# Patient Record
Sex: FEMALE | Race: WHITE | Employment: STUDENT | URBAN - NONMETROPOLITAN AREA
[De-identification: names, ages, dates, MRNs, and addresses within clinical notes are randomized per-mention and may not be internally consistent; named-entity substitution may affect disease eponyms.]

---

## 2018-09-15 ENCOUNTER — APPOINTMENT (OUTPATIENT)
Dept: GENERAL RADIOLOGY | Age: 18
DRG: 420 | End: 2018-09-15
Payer: MEDICAID

## 2018-09-15 ENCOUNTER — HOSPITAL ENCOUNTER (INPATIENT)
Age: 18
LOS: 2 days | Discharge: HOME | DRG: 420 | End: 2018-09-17
Attending: EMERGENCY MEDICINE | Admitting: HOSPITALIST
Payer: MEDICAID

## 2018-09-15 DIAGNOSIS — E10.10 TYPE 1 DIABETES MELLITUS WITH KETOACIDOSIS WITHOUT COMA (HCC): Primary | ICD-10-CM

## 2018-09-15 LAB
ALLEN TEST: NEGATIVE
AMPHETAMINE+METHAMPHETAMINE URINE SCREEN: NEGATIVE
ANION GAP SERPL CALCULATED.3IONS-SCNC: 13 MEQ/L (ref 8–16)
ANION GAP SERPL CALCULATED.3IONS-SCNC: 18 MEQ/L (ref 8–16)
ANION GAP SERPL CALCULATED.3IONS-SCNC: 34 MEQ/L (ref 8–16)
BACTERIA: ABNORMAL /HPF
BARBITURATE QUANTITATIVE URINE: NEGATIVE
BASE EXCESS (CALCULATED): -20 MMOL/L (ref -2.5–2.5)
BASOPHILS # BLD: 0.4 %
BASOPHILS ABSOLUTE: 0.1 THOU/MM3 (ref 0–0.1)
BENZODIAZEPINE QUANTITATIVE URINE: NEGATIVE
BETA-HYDROXYBUTYRATE: 74.89 MG/DL (ref 0.2–2.81)
BILIRUBIN URINE: NEGATIVE
BLOOD, URINE: NEGATIVE
BUN BLDV-MCNC: 13 MG/DL (ref 7–22)
BUN BLDV-MCNC: 13 MG/DL (ref 7–22)
BUN BLDV-MCNC: 28 MG/DL (ref 7–22)
CALCIUM SERPL-MCNC: 8.3 MG/DL (ref 8.5–10.5)
CALCIUM SERPL-MCNC: 8.4 MG/DL (ref 8.5–10.5)
CALCIUM SERPL-MCNC: 9.7 MG/DL (ref 8.5–10.5)
CANNABINOID QUANTITATIVE URINE: NEGATIVE
CASTS 2: ABNORMAL /LPF
CASTS UA: ABNORMAL /LPF
CHARACTER, URINE: CLEAR
CHLORIDE BLD-SCNC: 100 MEQ/L (ref 98–111)
CHLORIDE BLD-SCNC: 106 MEQ/L (ref 98–111)
CHLORIDE BLD-SCNC: 90 MEQ/L (ref 98–111)
CO2: 15 MEQ/L (ref 23–33)
CO2: 17 MEQ/L (ref 23–33)
CO2: 6 MEQ/L (ref 23–33)
COCAINE METABOLITE QUANTITATIVE URINE: NEGATIVE
COLLECTED BY:: ABNORMAL
COLOR: YELLOW
CREAT SERPL-MCNC: 0.8 MG/DL (ref 0.4–1.2)
CREAT SERPL-MCNC: 1 MG/DL (ref 0.4–1.2)
CREAT SERPL-MCNC: 1.3 MG/DL (ref 0.4–1.2)
CRYSTALS, UA: ABNORMAL
DEVICE: ABNORMAL
EOSINOPHIL # BLD: 0 %
EOSINOPHILS ABSOLUTE: 0 THOU/MM3 (ref 0–0.4)
EPITHELIAL CELLS, UA: ABNORMAL /HPF
ERYTHROCYTE [DISTWIDTH] IN BLOOD BY AUTOMATED COUNT: 11.9 % (ref 11.5–14.5)
ERYTHROCYTE [DISTWIDTH] IN BLOOD BY AUTOMATED COUNT: 41.1 FL (ref 35–45)
ETHYL ALCOHOL, SERUM: < 0.01 %
GLUCOSE BLD-MCNC: 107 MG/DL (ref 70–108)
GLUCOSE BLD-MCNC: 120 MG/DL (ref 70–108)
GLUCOSE BLD-MCNC: 138 MG/DL (ref 70–108)
GLUCOSE BLD-MCNC: 160 MG/DL (ref 70–108)
GLUCOSE BLD-MCNC: 166 MG/DL (ref 70–108)
GLUCOSE BLD-MCNC: 177 MG/DL (ref 70–108)
GLUCOSE BLD-MCNC: 191 MG/DL (ref 70–108)
GLUCOSE BLD-MCNC: 195 MG/DL (ref 70–108)
GLUCOSE BLD-MCNC: 198 MG/DL (ref 70–108)
GLUCOSE BLD-MCNC: 213 MG/DL (ref 70–108)
GLUCOSE BLD-MCNC: 225 MG/DL (ref 70–108)
GLUCOSE BLD-MCNC: 243 MG/DL (ref 70–108)
GLUCOSE BLD-MCNC: 270 MG/DL (ref 70–108)
GLUCOSE BLD-MCNC: 288 MG/DL (ref 70–108)
GLUCOSE BLD-MCNC: 298 MG/DL (ref 70–108)
GLUCOSE BLD-MCNC: 307 MG/DL (ref 70–108)
GLUCOSE BLD-MCNC: 311 MG/DL (ref 70–108)
GLUCOSE BLD-MCNC: 328 MG/DL (ref 70–108)
GLUCOSE BLD-MCNC: 416 MG/DL (ref 70–108)
GLUCOSE BLD-MCNC: 570 MG/DL (ref 70–108)
GLUCOSE BLD-MCNC: 627 MG/DL (ref 70–108)
GLUCOSE BLD-MCNC: > 600 MG/DL (ref 70–108)
GLUCOSE URINE: >= 1000 MG/DL
HCO3: 6 MMOL/L (ref 23–28)
HCT VFR BLD CALC: 44.5 % (ref 37–47)
HEMOGLOBIN: 14.1 GM/DL (ref 12–16)
IMMATURE GRANS (ABS): 0.17 THOU/MM3 (ref 0–0.07)
IMMATURE GRANULOCYTES: 0.6 %
KETONES, URINE: >= 160
LACTIC ACID: 4.7 MMOL/L (ref 0.5–2.2)
LEUKOCYTE ESTERASE, URINE: NEGATIVE
LYMPHOCYTES # BLD: 5.3 %
LYMPHOCYTES ABSOLUTE: 1.5 THOU/MM3 (ref 1–4.8)
MAGNESIUM: 1.8 MG/DL (ref 1.6–2.4)
MAGNESIUM: 1.8 MG/DL (ref 1.6–2.4)
MCH RBC QN AUTO: 29.7 PG (ref 26–33)
MCHC RBC AUTO-ENTMCNC: 31.7 GM/DL (ref 32.2–35.5)
MCV RBC AUTO: 93.7 FL (ref 81–99)
MISCELLANEOUS 2: ABNORMAL
MONOCYTES # BLD: 0.7 %
MONOCYTES ABSOLUTE: 0.2 THOU/MM3 (ref 0.4–1.3)
MRSA SCREEN RT-PCR: NEGATIVE
NITRITE, URINE: NEGATIVE
NUCLEATED RED BLOOD CELLS: 0 /100 WBC
O2 SATURATION: 98 %
OPIATES, URINE: NEGATIVE
OSMOLALITY CALCULATION: 295.6 MOSMOL/KG (ref 275–300)
OXYCODONE: NEGATIVE
PCO2: 18 MMHG (ref 35–45)
PH BLOOD GAS: 7.16 (ref 7.35–7.45)
PH UA: 5
PHENCYCLIDINE QUANTITATIVE URINE: NEGATIVE
PHOSPHORUS: 1.4 MG/DL (ref 2.4–4.7)
PHOSPHORUS: 3 MG/DL (ref 2.4–4.7)
PLATELET # BLD: 439 THOU/MM3 (ref 130–400)
PLATELET ESTIMATE: ABNORMAL
PMV BLD AUTO: 10.9 FL (ref 9.4–12.4)
PO2: 128 MMHG (ref 71–104)
POTASSIUM REFLEX MAGNESIUM: 5.2 MEQ/L (ref 3.5–5.2)
POTASSIUM SERPL-SCNC: 4.2 MEQ/L (ref 3.5–5.2)
POTASSIUM SERPL-SCNC: 4.5 MEQ/L (ref 3.5–5.2)
PROTEIN UA: ABNORMAL
RBC # BLD: 4.75 MILL/MM3 (ref 4.2–5.4)
RBC URINE: ABNORMAL /HPF
RENAL EPITHELIAL, UA: ABNORMAL
SCAN OF BLOOD SMEAR: NORMAL
SEG NEUTROPHILS: 93 %
SEGMENTED NEUTROPHILS ABSOLUTE COUNT: 25.6 THOU/MM3 (ref 1.8–7.7)
SODIUM BLD-SCNC: 130 MEQ/L (ref 135–145)
SODIUM BLD-SCNC: 133 MEQ/L (ref 135–145)
SODIUM BLD-SCNC: 136 MEQ/L (ref 135–145)
SOURCE, BLOOD GAS: ABNORMAL
SPECIFIC GRAVITY, URINE: 1.02 (ref 1–1.03)
UROBILINOGEN, URINE: 0.2 EU/DL
WBC # BLD: 27.5 THOU/MM3 (ref 4.8–10.8)
WBC UA: ABNORMAL /HPF
YEAST: ABNORMAL

## 2018-09-15 PROCEDURE — 2580000003 HC RX 258: Performed by: EMERGENCY MEDICINE

## 2018-09-15 PROCEDURE — 83735 ASSAY OF MAGNESIUM: CPT

## 2018-09-15 PROCEDURE — 80048 BASIC METABOLIC PNL TOTAL CA: CPT

## 2018-09-15 PROCEDURE — 6360000002 HC RX W HCPCS: Performed by: INTERNAL MEDICINE

## 2018-09-15 PROCEDURE — 80307 DRUG TEST PRSMV CHEM ANLYZR: CPT

## 2018-09-15 PROCEDURE — 2580000003 HC RX 258: Performed by: INTERNAL MEDICINE

## 2018-09-15 PROCEDURE — 85025 COMPLETE CBC W/AUTO DIFF WBC: CPT

## 2018-09-15 PROCEDURE — 87081 CULTURE SCREEN ONLY: CPT

## 2018-09-15 PROCEDURE — 83605 ASSAY OF LACTIC ACID: CPT

## 2018-09-15 PROCEDURE — 82803 BLOOD GASES ANY COMBINATION: CPT

## 2018-09-15 PROCEDURE — 2709999900 HC NON-CHARGEABLE SUPPLY

## 2018-09-15 PROCEDURE — 96366 THER/PROPH/DIAG IV INF ADDON: CPT

## 2018-09-15 PROCEDURE — 84100 ASSAY OF PHOSPHORUS: CPT

## 2018-09-15 PROCEDURE — 87641 MR-STAPH DNA AMP PROBE: CPT

## 2018-09-15 PROCEDURE — 6360000002 HC RX W HCPCS: Performed by: EMERGENCY MEDICINE

## 2018-09-15 PROCEDURE — 82010 KETONE BODYS QUAN: CPT

## 2018-09-15 PROCEDURE — 36600 WITHDRAWAL OF ARTERIAL BLOOD: CPT

## 2018-09-15 PROCEDURE — 96375 TX/PRO/DX INJ NEW DRUG ADDON: CPT

## 2018-09-15 PROCEDURE — 82948 REAGENT STRIP/BLOOD GLUCOSE: CPT

## 2018-09-15 PROCEDURE — 99285 EMERGENCY DEPT VISIT HI MDM: CPT

## 2018-09-15 PROCEDURE — 96365 THER/PROPH/DIAG IV INF INIT: CPT

## 2018-09-15 PROCEDURE — 2000000000 HC ICU R&B

## 2018-09-15 PROCEDURE — 36415 COLL VENOUS BLD VENIPUNCTURE: CPT

## 2018-09-15 PROCEDURE — 96376 TX/PRO/DX INJ SAME DRUG ADON: CPT

## 2018-09-15 PROCEDURE — 6370000000 HC RX 637 (ALT 250 FOR IP): Performed by: EMERGENCY MEDICINE

## 2018-09-15 PROCEDURE — 87086 URINE CULTURE/COLONY COUNT: CPT

## 2018-09-15 PROCEDURE — 81001 URINALYSIS AUTO W/SCOPE: CPT

## 2018-09-15 PROCEDURE — 99291 CRITICAL CARE FIRST HOUR: CPT | Performed by: INTERNAL MEDICINE

## 2018-09-15 PROCEDURE — G0480 DRUG TEST DEF 1-7 CLASSES: HCPCS

## 2018-09-15 RX ORDER — DEXTROSE MONOHYDRATE 25 G/50ML
12.5 INJECTION, SOLUTION INTRAVENOUS PRN
Status: DISCONTINUED | OUTPATIENT
Start: 2018-09-15 | End: 2018-09-17 | Stop reason: HOSPADM

## 2018-09-15 RX ORDER — ONDANSETRON 2 MG/ML
4 INJECTION INTRAMUSCULAR; INTRAVENOUS EVERY 30 MIN PRN
Status: DISCONTINUED | OUTPATIENT
Start: 2018-09-15 | End: 2018-09-17 | Stop reason: HOSPADM

## 2018-09-15 RX ORDER — NICOTINE POLACRILEX 4 MG
15 LOZENGE BUCCAL PRN
Status: DISCONTINUED | OUTPATIENT
Start: 2018-09-15 | End: 2018-09-17 | Stop reason: HOSPADM

## 2018-09-15 RX ORDER — 0.9 % SODIUM CHLORIDE 0.9 %
30 INTRAVENOUS SOLUTION INTRAVENOUS ONCE
Status: COMPLETED | OUTPATIENT
Start: 2018-09-15 | End: 2018-09-15

## 2018-09-15 RX ORDER — DEXTROSE MONOHYDRATE 25 G/50ML
12.5 INJECTION, SOLUTION INTRAVENOUS PRN
Status: DISCONTINUED | OUTPATIENT
Start: 2018-09-15 | End: 2018-09-15 | Stop reason: SDUPTHER

## 2018-09-15 RX ORDER — DEXTROSE AND SODIUM CHLORIDE 5; .9 G/100ML; G/100ML
INJECTION, SOLUTION INTRAVENOUS CONTINUOUS
Status: DISCONTINUED | OUTPATIENT
Start: 2018-09-15 | End: 2018-09-16

## 2018-09-15 RX ORDER — HYDROCODONE BITARTRATE AND ACETAMINOPHEN 5; 325 MG/1; MG/1
2 TABLET ORAL EVERY 4 HOURS PRN
Status: DISCONTINUED | OUTPATIENT
Start: 2018-09-15 | End: 2018-09-17 | Stop reason: HOSPADM

## 2018-09-15 RX ORDER — ACETAMINOPHEN 325 MG/1
650 TABLET ORAL EVERY 4 HOURS PRN
Status: DISCONTINUED | OUTPATIENT
Start: 2018-09-15 | End: 2018-09-17 | Stop reason: HOSPADM

## 2018-09-15 RX ORDER — SODIUM CHLORIDE 0.9 % (FLUSH) 0.9 %
10 SYRINGE (ML) INJECTION EVERY 12 HOURS SCHEDULED
Status: DISCONTINUED | OUTPATIENT
Start: 2018-09-15 | End: 2018-09-17 | Stop reason: HOSPADM

## 2018-09-15 RX ORDER — FLUOXETINE HYDROCHLORIDE 20 MG/1
20 CAPSULE ORAL DAILY
COMMUNITY
End: 2020-02-04

## 2018-09-15 RX ORDER — SODIUM CHLORIDE 0.9 % (FLUSH) 0.9 %
10 SYRINGE (ML) INJECTION PRN
Status: DISCONTINUED | OUTPATIENT
Start: 2018-09-15 | End: 2018-09-17 | Stop reason: HOSPADM

## 2018-09-15 RX ORDER — DEXTROSE MONOHYDRATE 50 MG/ML
100 INJECTION, SOLUTION INTRAVENOUS PRN
Status: DISCONTINUED | OUTPATIENT
Start: 2018-09-15 | End: 2018-09-17 | Stop reason: HOSPADM

## 2018-09-15 RX ORDER — HYDROCODONE BITARTRATE AND ACETAMINOPHEN 5; 325 MG/1; MG/1
1 TABLET ORAL EVERY 4 HOURS PRN
Status: DISCONTINUED | OUTPATIENT
Start: 2018-09-15 | End: 2018-09-17 | Stop reason: HOSPADM

## 2018-09-15 RX ORDER — SODIUM CHLORIDE 9 MG/ML
INJECTION, SOLUTION INTRAVENOUS CONTINUOUS
Status: DISCONTINUED | OUTPATIENT
Start: 2018-09-15 | End: 2018-09-15

## 2018-09-15 RX ADMIN — DEXTROSE AND SODIUM CHLORIDE: 5; 900 INJECTION, SOLUTION INTRAVENOUS at 23:30

## 2018-09-15 RX ADMIN — ENOXAPARIN SODIUM 40 MG: 40 INJECTION SUBCUTANEOUS at 16:06

## 2018-09-15 RX ADMIN — SODIUM CHLORIDE 1 UNITS/HR: 9 INJECTION, SOLUTION INTRAVENOUS at 03:58

## 2018-09-15 RX ADMIN — SODIUM CHLORIDE 2313 ML: 9 INJECTION, SOLUTION INTRAVENOUS at 03:07

## 2018-09-15 RX ADMIN — SODIUM CHLORIDE: 9 INJECTION, SOLUTION INTRAVENOUS at 05:41

## 2018-09-15 RX ADMIN — SODIUM CHLORIDE: 9 INJECTION, SOLUTION INTRAVENOUS at 08:49

## 2018-09-15 RX ADMIN — SODIUM CHLORIDE: 9 INJECTION, SOLUTION INTRAVENOUS at 17:20

## 2018-09-15 RX ADMIN — Medication 4 MG: at 03:06

## 2018-09-15 RX ADMIN — Medication 10 UNITS: at 03:10

## 2018-09-15 ASSESSMENT — PAIN SCALES - GENERAL
PAINLEVEL_OUTOF10: 0

## 2018-09-15 ASSESSMENT — ENCOUNTER SYMPTOMS
WHEEZING: 0
DIARRHEA: 0
SORE THROAT: 0
CHEST TIGHTNESS: 1
COUGH: 0
BLOOD IN STOOL: 0
ABDOMINAL PAIN: 0
VOMITING: 1
NAUSEA: 1
BACK PAIN: 0
SHORTNESS OF BREATH: 1

## 2018-09-15 NOTE — PLAN OF CARE
Problem: Discharge Planning:  Goal: Discharged to appropriate level of care  Discharged to appropriate level of care   Outcome: Ongoing  Plans for home to dorm/alone    Problem: Fluid Volume - Imbalance:  Goal: Will remain free of signs and symptoms of dehydration  Will remain free of signs and symptoms of dehydration   Outcome: Met This Shift    Goal: Absence of imbalanced fluid volume signs and symptoms  Absence of imbalanced fluid volume signs and symptoms   Outcome: Ongoing  Fluids ongoing, monitoring labs      Problem: Serum Glucose Level - Abnormal:  Goal: Ability to maintain appropriate glucose levels will improve  Ability to maintain appropriate glucose levels will improve  Outcome: Not Met This Shift  Insulin gtt continues - Type 1 DM      Problem: Injury - Acid Base Imbalance:  Goal: Acid-base balance  Acid-base balance   Outcome: Ongoing  Continue tx, monitoring labs

## 2018-09-15 NOTE — ED TRIAGE NOTES
Pt presents to the ED via EMS with c/o hyperglycemia. Pt reports she ran out of her Novolog yesterday around 1500. Upon arrival to the ED pts BS is 570. Dr Vanessa Dad to bedside. Pt alert and oriented x4. Pt has a Hx of anxiety and is reporting anxiety at this time,  on arrival. Call light in reach. Will monitor.

## 2018-09-15 NOTE — ED PROVIDER NOTES
exhibits no distension and no pulsatile midline mass. There is no tenderness. There is no rebound and no guarding. Musculoskeletal: Normal range of motion. She exhibits no edema or tenderness (No calf pain or tenderness. No evidence of DVT). Neurological: She is alert and oriented to person, place, and time. She has normal strength. She displays no tremor. She displays no seizure activity. Coordination normal. GCS eye subscore is 4. GCS verbal subscore is 5. GCS motor subscore is 6. Skin: Skin is warm and dry. No rash (on exposed surfaced) noted. She is not diaphoretic. No cyanosis or erythema. There is pallor. Psychiatric: She has a normal mood and affect. Her speech is normal and behavior is normal.   Nursing note and vitals reviewed. DIFFERENTIAL DIAGNOSIS:   Hyperglycemia, DKA, dehydration or electrolyte abnormality. DIAGNOSTIC RESULTS     RADIOLOGY: non-plain film images(s) such as CT, Ultrasound and MRI are read by the radiologist.    Patient refused a chest x-ray.     LABS:   Results for orders placed or performed during the hospital encounter of 09/15/18   CBC auto differential   Result Value Ref Range    WBC 27.5 (H) 4.8 - 10.8 thou/mm3    RBC 4.75 4.20 - 5.40 mill/mm3    Hemoglobin 14.1 12.0 - 16.0 gm/dl    Hematocrit 44.5 37.0 - 47.0 %    MCV 93.7 81.0 - 99.0 fL    MCH 29.7 26.0 - 33.0 pg    MCHC 31.7 (L) 32.2 - 35.5 gm/dl    RDW-CV 11.9 11.5 - 14.5 %    RDW-SD 41.1 35.0 - 45.0 fL    Platelets 473 (H) 880 - 400 thou/mm3    MPV 10.9 9.4 - 12.4 fL    Seg Neutrophils 93.0 %    Lymphocytes 5.3 %    Monocytes 0.7 %    Eosinophils 0.0 %    Basophils 0.4 %    Immature Granulocytes 0.6 %    Platelet Estimate SL INCREASED Adequate    Segs Absolute 25.6 (H) 1.8 - 7.7 thou/mm3    Lymphocytes # 1.5 1.0 - 4.8 thou/mm3    Monocytes # 0.2 (L) 0.4 - 1.3 thou/mm3    Eosinophils # 0.0 0.0 - 0.4 thou/mm3    Basophils # 0.1 0.0 - 0.1 thou/mm3    Immature Grans (Abs) 0.17 (H) 0.00 - 0.07 thou/mm3    nRBC 0 /100 and asked that I enter facilitated orders to admit to ICU on his behalf. Hospitalist to assume further care and orders for patient at this time. CRITICAL CARE:  There was a high probability of clinically significant/life threatening deterioration in this patient's condition which required my urgent intervention. Total critical care time was 40 minutes. This excludes any time for separately reportable procedures. FINAL IMPRESSION      1.  Type 1 diabetes mellitus with ketoacidosis without coma [facility])          DISPOSITION/PLAN   DISPOSITION Admitted 09/15/2018 06:36:11 AM    PATIENT REFERRED TO:  Tanvi Borjas MD  [address] [facility] [address]  [address] [facility]  [address] [facility] [address]  Jenniffer Reyna MD  [address] [facility], [address] [place] [address] [id number]            DISCHARGE MEDICATIONS:  New Prescriptions    No medications on file     Dr. Ha Wilkes M.D 9/15/18 6:41 AM              Ha Wilkes MD  09/15/18 9752

## 2018-09-15 NOTE — CONSULTS
 Sexual activity: Not Asked     Other Topics Concern    None     Social History Narrative    None         History reviewed. No pertinent family history. CURRENT MEDICATIONS  Reviewed       ALLERGIES  Allergies   Allergen Reactions    Sulfa Antibiotics        PHYSICAL EXAM:    VITAL SIGNS: BP (!) 116/59   Pulse 108   Temp 99.4 °F (37.4 °C) (Oral)   Resp 16   Ht 4' 11\" (1.499 m)   Wt 170 lb (77.1 kg)   LMP 08/18/2018   SpO2 97%   BMI 34.34 kg/m²          Constitutional: Well developed, Well nourished. Pleasant young adult female, lying in bed  HENT: Head normocephalic, atraumatic; Bilateral external ears normal; Mucous membranes pink and dry; oropharynx without exudates. Eyes: PERRL, Conjunctiva normal, No discharge. Anicteric sclera. Neck: Supple, No tenderness, Trachea midline, no JVD   Lymphatic: No lymphadenopathy noted. Cardiovascular: Normal heart rate, Normal rhythm, No murmurs, No rubs, No gallops. Thorax & Lungs: Clear to auscultation bilaterally. No wheezes. No rhonchi. No crackles. Symmetrical chest expansion, no accessory muscle use  Abdomen: Bowel sounds normal, Soft, No tenderness, No distension. No organomegaly, No pulsatile masses. Skin: Warm, Dry, No erythema, No rash. Extremities: No edema of lower extremities. No tenderness. No cyanosis. No clubbing. Good peripheral pulse/capillary refill. Musculoskeletal:  No tenderness to palpation or major deformities noted. Neurologic: Alert & oriented x 3. No gross focal deficits noted. Psychiatric: Affect normal, Judgment normal, Mood normal.       INTAKE/OUTPUT:  No intake/output data recorded. No intake/output data recorded. LABS  Hematology  Recent Labs      09/15/18   0326   WBC  27.5*   HCT  44.5   PLT  439*     No results for input(s): PROTIME, INR, PTT in the last 72 hours.     Chemistries  Recent Labs      09/15/18   0326   NA  130*   K  5.2   CL  90*   CO2  6*   BUN  28*   CREATININE  1.3*     Recent Labs

## 2018-09-15 NOTE — ED NOTES
In to reassess pt. Pt back to cot following up to bathroom, pt tolerated well. VS stable. No significant changes. Will continue to monitor.      Nolvia Palomino RN  09/15/18 3174

## 2018-09-16 LAB
ANION GAP SERPL CALCULATED.3IONS-SCNC: 12 MEQ/L (ref 8–16)
ANION GAP SERPL CALCULATED.3IONS-SCNC: 15 MEQ/L (ref 8–16)
ANION GAP SERPL CALCULATED.3IONS-SCNC: 15 MEQ/L (ref 8–16)
BUN BLDV-MCNC: 7 MG/DL (ref 7–22)
BUN BLDV-MCNC: 8 MG/DL (ref 7–22)
BUN BLDV-MCNC: 9 MG/DL (ref 7–22)
CALCIUM SERPL-MCNC: 8.1 MG/DL (ref 8.5–10.5)
CALCIUM SERPL-MCNC: 8.1 MG/DL (ref 8.5–10.5)
CALCIUM SERPL-MCNC: 8.9 MG/DL (ref 8.5–10.5)
CHLORIDE BLD-SCNC: 102 MEQ/L (ref 98–111)
CHLORIDE BLD-SCNC: 106 MEQ/L (ref 98–111)
CHLORIDE BLD-SCNC: 106 MEQ/L (ref 98–111)
CO2: 15 MEQ/L (ref 23–33)
CO2: 17 MEQ/L (ref 23–33)
CO2: 20 MEQ/L (ref 23–33)
CREAT SERPL-MCNC: 0.7 MG/DL (ref 0.4–1.2)
GLUCOSE BLD-MCNC: 120 MG/DL (ref 70–108)
GLUCOSE BLD-MCNC: 131 MG/DL (ref 70–108)
GLUCOSE BLD-MCNC: 135 MG/DL (ref 70–108)
GLUCOSE BLD-MCNC: 152 MG/DL (ref 70–108)
GLUCOSE BLD-MCNC: 155 MG/DL (ref 70–108)
GLUCOSE BLD-MCNC: 170 MG/DL (ref 70–108)
GLUCOSE BLD-MCNC: 170 MG/DL (ref 70–108)
GLUCOSE BLD-MCNC: 177 MG/DL (ref 70–108)
GLUCOSE BLD-MCNC: 177 MG/DL (ref 70–108)
GLUCOSE BLD-MCNC: 179 MG/DL (ref 70–108)
GLUCOSE BLD-MCNC: 184 MG/DL (ref 70–108)
MAGNESIUM: 1.7 MG/DL (ref 1.6–2.4)
MAGNESIUM: 1.7 MG/DL (ref 1.6–2.4)
ORGANISM: ABNORMAL
PHOSPHORUS: 2.7 MG/DL (ref 2.4–4.7)
PHOSPHORUS: 2.7 MG/DL (ref 2.4–4.7)
POTASSIUM SERPL-SCNC: 3.3 MEQ/L (ref 3.5–5.2)
POTASSIUM SERPL-SCNC: 3.5 MEQ/L (ref 3.5–5.2)
POTASSIUM SERPL-SCNC: 3.6 MEQ/L (ref 3.5–5.2)
SODIUM BLD-SCNC: 134 MEQ/L (ref 135–145)
SODIUM BLD-SCNC: 136 MEQ/L (ref 135–145)
SODIUM BLD-SCNC: 138 MEQ/L (ref 135–145)
URINE CULTURE REFLEX: ABNORMAL

## 2018-09-16 PROCEDURE — 36415 COLL VENOUS BLD VENIPUNCTURE: CPT

## 2018-09-16 PROCEDURE — 83735 ASSAY OF MAGNESIUM: CPT

## 2018-09-16 PROCEDURE — 2580000003 HC RX 258: Performed by: INTERNAL MEDICINE

## 2018-09-16 PROCEDURE — 2500000003 HC RX 250 WO HCPCS: Performed by: INTERNAL MEDICINE

## 2018-09-16 PROCEDURE — 6370000000 HC RX 637 (ALT 250 FOR IP): Performed by: INTERNAL MEDICINE

## 2018-09-16 PROCEDURE — 1200000000 HC SEMI PRIVATE

## 2018-09-16 PROCEDURE — 2709999900 HC NON-CHARGEABLE SUPPLY

## 2018-09-16 PROCEDURE — 84100 ASSAY OF PHOSPHORUS: CPT

## 2018-09-16 PROCEDURE — 82948 REAGENT STRIP/BLOOD GLUCOSE: CPT

## 2018-09-16 PROCEDURE — 99233 SBSQ HOSP IP/OBS HIGH 50: CPT | Performed by: INTERNAL MEDICINE

## 2018-09-16 PROCEDURE — 6360000002 HC RX W HCPCS: Performed by: INTERNAL MEDICINE

## 2018-09-16 PROCEDURE — 80048 BASIC METABOLIC PNL TOTAL CA: CPT

## 2018-09-16 RX ORDER — POTASSIUM CHLORIDE 7.45 MG/ML
10 INJECTION INTRAVENOUS
Status: DISCONTINUED | OUTPATIENT
Start: 2018-09-16 | End: 2018-09-16

## 2018-09-16 RX ORDER — FLUOXETINE HYDROCHLORIDE 20 MG/1
20 CAPSULE ORAL DAILY
Status: DISCONTINUED | OUTPATIENT
Start: 2018-09-16 | End: 2018-09-17 | Stop reason: HOSPADM

## 2018-09-16 RX ADMIN — Medication 10 ML: at 09:00

## 2018-09-16 RX ADMIN — FLUOXETINE HYDROCHLORIDE 20 MG: 20 CAPSULE ORAL at 12:47

## 2018-09-16 RX ADMIN — DEXTROSE AND SODIUM CHLORIDE: 5; 900 INJECTION, SOLUTION INTRAVENOUS at 07:51

## 2018-09-16 RX ADMIN — SODIUM PHOSPHATE, MONOBASIC, MONOHYDRATE 19.5 MMOL: 276; 142 INJECTION, SOLUTION INTRAVENOUS at 00:21

## 2018-09-16 RX ADMIN — ENOXAPARIN SODIUM 40 MG: 40 INJECTION SUBCUTANEOUS at 12:50

## 2018-09-16 RX ADMIN — Medication 10 ML: at 19:58

## 2018-09-16 RX ADMIN — POTASSIUM BICARBONATE 20 MEQ: 782 TABLET, EFFERVESCENT ORAL at 11:13

## 2018-09-16 ASSESSMENT — PAIN SCALES - GENERAL
PAINLEVEL_OUTOF10: 0

## 2018-09-16 NOTE — PROGRESS NOTES
1010 - pt.'s own Insulin which was locked in medication drawer is returned to her. She is preparing pump for insertion.

## 2018-09-16 NOTE — PLAN OF CARE
Problem: Discharge Planning:  Goal: Discharged to appropriate level of care  Discharged to appropriate level of care   Outcome: Not Met This Shift  No transfer orders. Problem: Fluid Volume - Imbalance:  Goal: Will remain free of signs and symptoms of dehydration  Will remain free of signs and symptoms of dehydration   Outcome: Ongoing  Pt still with poor skin turgor and dry mucous membranes, as well as increased thirst.   Goal: Absence of imbalanced fluid volume signs and symptoms  Absence of imbalanced fluid volume signs and symptoms   Outcome: Ongoing  No overt s/s of fluid overload, does appear sl dehydrated however. Problem: Serum Glucose Level - Abnormal:  Goal: Ability to maintain appropriate glucose levels will improve  Ability to maintain appropriate glucose levels will improve   Outcome: Ongoing  Pt on insulin gtt with DKA parameters, still with elevated glucose but much more control with this in place. Problem: Injury - Acid Base Imbalance:  Goal: Acid-base balance  Acid-base balance   Outcome: Ongoing  Last ABG ph very acidotic, however not repeated since admission. CO2 on BMP still low however with anion gap of 13-15. Comments: Care plan reviewed with patient. Patient verbalizes understanding of the plan of care and contribute to goal setting.

## 2018-09-16 NOTE — PLAN OF CARE
Problem: Discharge Planning:  Goal: Discharged to appropriate level of care  Discharged to appropriate level of care   Outcome: Met This Shift      Problem: Fluid Volume - Imbalance:  Goal: Will remain free of signs and symptoms of dehydration  Will remain free of signs and symptoms of dehydration   Outcome: Met This Shift    Goal: Absence of imbalanced fluid volume signs and symptoms  Absence of imbalanced fluid volume signs and symptoms   Outcome: Met This Shift      Problem: Serum Glucose Level - Abnormal:  Goal: Ability to maintain appropriate glucose levels will improve  Ability to maintain appropriate glucose levels will improve   Outcome: Ongoing  Insulin pump resumed     Problem: Injury - Acid Base Imbalance:  Goal: Acid-base balance  Acid-base balance   Outcome: Ongoing  Monitoring

## 2018-09-16 NOTE — PROGRESS NOTES
Hospitalist Progress Note    Patient:  Gregory Lira      Unit/Bed:5K-08/008-A    YOB: 2000    MRN: [id number]       Acct: [de-identified]     PCP: Kaleb Cruz MD    Date of Admission: 9/15/2018    Chief Complaint: Nausea, vomiting, DKA    Hospital Course: Admitted to ICU with DKA, initial anion gap 35. Patient received copious IVF and placed on IV insulin gtt. Episode due to running out of insulin for her pump. Subjective: 9/16/18 - Feels much better, denies any specific complaints at this time. Medications:  Reviewed    Infusion Medications    Insulin Pump - insulin aspart 1.2 Units/hr (09/16/18 1040)    dextrose       Scheduled Medications    potassium bicarb-citric acid  20 mEq Oral BID    FLUoxetine  20 mg Oral Daily    sodium chloride flush  10 mL Intravenous 2 times per day    enoxaparin  40 mg Subcutaneous Q24H    potassium (CARDIAC) replacement protocol   Other RX Placeholder    calcium replacement protocol   Other RX Placeholder    phosphorus replacement protocol   Other RX Placeholder    magnesium replacement protocol   Other RX Placeholder     PRN Meds: ondansetron, sodium chloride flush, acetaminophen, HYDROcodone 5 mg - acetaminophen **OR** HYDROcodone 5 mg - acetaminophen, glucose, dextrose, glucagon (rDNA), dextrose      Intake/Output Summary (Last 24 hours) at 09/16/18 1739  Last data filed at 09/16/18 1253   Gross per 24 hour   Intake          4087.49 ml   Output                0 ml   Net          4087.49 ml       Diet:  DIET CARB CONTROL; Carb Control: 4 carb choices (60 gms)/meal    Exam:  /76   Pulse 95   Temp 98 °F (36.7 °C) (Oral)   Resp 18   Ht 4' 11\" (1.499 m)   Wt 167 lb 15.9 oz (76.2 kg)   LMP 08/18/2018   SpO2 95%   BMI 33.93 kg/m²     General appearance: No apparent distress, appears stated age and cooperative. HEENT: Pupils equal, round, and reactive to light. Conjunctivae/corneas clear. Neck: Supple, with full range of motion. [] Kaleb       [] Other-    Code Status: Full Code    PT/OT Eval Status: no    Assessment/Plan:    Anticipated Discharge in : 1 day    Active Hospital Problems    Diagnosis Date Noted    Insulin-dependent diabetes mellitus with ketoacidosis ([facility] [E11.10, Z79.4] 09/15/2018       1. Restarted patient's own insulin pump today and monitoring glucose. 2. Transfer out of ICU  3.  Anticipate discharge in the AM        Electronically signed by Pranay Bailey DO on 9/16/2018 at 5:39 PM

## 2018-09-17 VITALS
SYSTOLIC BLOOD PRESSURE: 132 MMHG | WEIGHT: 167.99 LBS | OXYGEN SATURATION: 96 % | BODY MASS INDEX: 33.87 KG/M2 | TEMPERATURE: 97.6 F | RESPIRATION RATE: 20 BRPM | DIASTOLIC BLOOD PRESSURE: 78 MMHG | HEIGHT: 59 IN | HEART RATE: 83 BPM

## 2018-09-17 LAB
ANION GAP SERPL CALCULATED.3IONS-SCNC: 14 MEQ/L (ref 8–16)
BUN BLDV-MCNC: 5 MG/DL (ref 7–22)
CALCIUM SERPL-MCNC: 9 MG/DL (ref 8.5–10.5)
CHLORIDE BLD-SCNC: 104 MEQ/L (ref 98–111)
CO2: 22 MEQ/L (ref 23–33)
CREAT SERPL-MCNC: 0.5 MG/DL (ref 0.4–1.2)
GLUCOSE BLD-MCNC: 144 MG/DL (ref 70–108)
MAGNESIUM: 1.8 MG/DL (ref 1.6–2.4)
MRSA SCREEN: NORMAL
POTASSIUM SERPL-SCNC: 3.6 MEQ/L (ref 3.5–5.2)
SODIUM BLD-SCNC: 140 MEQ/L (ref 135–145)
VRE CULTURE: NORMAL

## 2018-09-17 PROCEDURE — 80048 BASIC METABOLIC PNL TOTAL CA: CPT

## 2018-09-17 PROCEDURE — 6370000000 HC RX 637 (ALT 250 FOR IP): Performed by: INTERNAL MEDICINE

## 2018-09-17 PROCEDURE — 99232 SBSQ HOSP IP/OBS MODERATE 35: CPT | Performed by: HOSPITALIST

## 2018-09-17 PROCEDURE — 36415 COLL VENOUS BLD VENIPUNCTURE: CPT

## 2018-09-17 PROCEDURE — 83735 ASSAY OF MAGNESIUM: CPT

## 2018-09-17 RX ORDER — POTASSIUM CHLORIDE 20 MEQ/1
20 TABLET, EXTENDED RELEASE ORAL ONCE
Status: COMPLETED | OUTPATIENT
Start: 2018-09-17 | End: 2018-09-17

## 2018-09-17 RX ADMIN — POTASSIUM CHLORIDE 20 MEQ: 20 TABLET, EXTENDED RELEASE ORAL at 11:44

## 2018-09-17 RX ADMIN — FLUOXETINE HYDROCHLORIDE 20 MG: 20 CAPSULE ORAL at 09:33

## 2018-09-17 NOTE — CARE COORDINATION
9/17/18, 10:10 AM    DISCHARGE BARRIERS      SW received a consult because patient does not have Insurance. KEVON met with patient. Patient is currently applying for Medicaid after being denied before. Patient is a Type 1 diabetic and has an insulin pump. Patient ran out of insulin and that is why she is in the hospital  Patient is unable to afford her insulin at this time and her aunt has been paying it out of pocket. KEVON is assisting patient in qualifying for [facility] [address] [place] [address] for Insulin. Update-10:40-  Patients insulin medications were [facility], [facility] within [facility] KEVON informed patient of this assistance and patient is aware to go down to pharmacy when discharged to  her meds before she goes home.

## 2018-09-17 NOTE — PLAN OF CARE
Problem: Discharge Planning:  Goal: Discharged to appropriate level of care  Discharged to appropriate level of care   Outcome: Ongoing  Patient planning to discharge home tomorrow. Problem: Fluid Volume - Imbalance:  Goal: Will remain free of signs and symptoms of dehydration  Will remain free of signs and symptoms of dehydration   Outcome: Ongoing  Patient free of signs and symptoms of dehydration. Skin turgor less than 3 seconds, mucus membranes moist.     Problem: Serum Glucose Level - Abnormal:  Goal: Ability to maintain appropriate glucose levels will improve  Ability to maintain appropriate glucose levels will improve   Outcome: Ongoing  Patient has insulin pump in her left back. Insulin was 177 when blood was drawn. Patient encouraged to eat a low carb diet. Will continue to monitor. Problem: Injury - Acid Base Imbalance:  Goal: Acid-base balance  Acid-base balance   Outcome: Ongoing  Patient remains alert and oriented times 4. No signs of breathing instability. Will continue to assess. Comments: Care plan reviewed with patient. Patient verbalize understanding of the plan of care and contribute to goal setting.

## 2018-09-17 NOTE — DISCHARGE SUMMARY
Hospital Medicine Discharge Summary      Patient Identification:   Edgard Tracey   : 2000  MRN: [id number]   Account: [de-identified]      Patient's PCP: Jesus Alberto Adkins MD    Admit Date: 9/15/2018     Discharge Date:   2018    Admitting Physician: Katrina John MD     Discharge Physician: Ramakrishna Price MD     Discharge Diagnoses: There are no active hospital problems to display for this patient. The patient was seen and examined on day of discharge and this discharge summary is in conjunction with any daily progress note from day of discharge. Hospital Course:   Edgard Tracey is a 21 y.o. female admitted to [address] [place] [address] on 9/15/2018 for IDDM and DKA due. Exam:     Vitals:  Vitals:    18 1536 18 1945 18 0500 18 0800   BP: 124/76 124/74 (!) 114/57 132/78   Pulse: 95 104 78 83   Resp:  20   Temp: 98 °F (36.7 °C) 98.5 °F (36.9 °C) 94.2 °F (34.6 °C) 97.6 °F (36.4 °C)   TempSrc: Oral Oral Oral Oral   SpO2: 95% 97% 96% 96%   Weight:       Height:         Weight: Weight: 167 lb 15.9 oz (76.2 kg)     24 hour intake/output:  Intake/Output Summary (Last 24 hours) at 18 0925  Last data filed at 18 0505   Gross per 24 hour   Intake             1133 ml   Output                0 ml   Net             1133 ml         General appearance:  Well in no apparent distress  HEENT:  Normal cephalic, atraumatic without obvious deformity. Pupils equal, round, and reactive to light. Extra ocular muscles intact. Conjunctivae/corneas clear. Neck: Supple, with full range of motion. No jugular venous distention. Trachea midline. Respiratory:  Normal respiratory effort. Clear to auscultation, bilaterally without Rales/Wheezes/Rhonchi. Cardiovascular:  Regular rate and rhythm with normal S1/S2 without murmurs, rubs or gallops. Abdomen: Soft, non-tender, non-distended with normal bowel sounds.   Musculoskeletal:  No clubbing, cyanosis or

## 2018-09-17 NOTE — CARE COORDINATION
9/17/18, 3:17 PM    Discharge plan discussed by  and . Discharge plan reviewed with patient/ family. Patient/ family verbalize understanding of discharge plan and are in agreement with plan. Understanding was demonstrated using the teach back method.

## 2018-09-17 NOTE — CARE COORDINATION
9/17/18, 7:29 AM      Yris Gomez       Admitted from: ER, patient presented due to hyperglycemia. She had ran out of her insulin. 9/15/2018/ 0240 Hospital day: 2   [facility] Reason for admit: Insulin-dependent diabetes mellitus with ketoacidosis ([facility] [E11.10, Z79.4] Status: Inpt. Admit order signed?: yes  PMH:  has a past medical history of Anxiety; Depression; and Diabetes mellitus ([facility] Procedure: N/A  Pertinent abnormal Imaging:N/a  Medications:  Scheduled Meds:   potassium bicarb-citric acid  20 mEq Oral BID    FLUoxetine  20 mg Oral Daily    sodium chloride flush  10 mL Intravenous 2 times per day    enoxaparin  40 mg Subcutaneous Q24H    potassium (CARDIAC) replacement protocol   Other RX Placeholder    calcium replacement protocol   Other RX Placeholder    phosphorus replacement protocol   Other RX Placeholder    magnesium replacement protocol   Other RX Placeholder     Continuous Infusions:   Insulin Pump - insulin aspart 1.2 Units/hr (09/16/18 1040)    dextrose        Pertinent Info/Orders/Treatment Plan: Patient was admitted to ICU and transferred to [place] [address]. DM management, electrolyte replacement protocol, resume home insulin pump, telemetry, discharge planning. Diet: DIET CARB CONTROL; Carb Control: 4 carb choices (60 gms)/meal   DVT Prophylaxis: Lovenox  Smoking status:  reports that she has never smoked.  She has never used smokeless tobacco.   Influenza Vaccination Screening Completed: n/a  Pneumonia Vaccination Screening Completed: yes  PCP: Angelica Matos MD  Readmission: No  Readmission Risk Score: 8%  Discharge Planning  Current Residence:  Other (Comment) (Dormatory)  Living Arrangements:  Alone   Support Systems:  Parent, Family Members, Therapist, Friends/Neighbors  Current Services PTA:     Potential Assistance Needed:  N/A  Potential Assistance Purchasing Medications:  No  Does patient want to participate in local refill/ meds to beds program?  No  Type of Home Care Services:  None  Patient expects to be discharged to:     Expected Discharge date: Follow Up Appointment: Best Day/ Time:    Discharge Plan: Ivonne Weems will be returning to [facility]. SS for [facility].     Evaluation: yes

## 2020-02-04 ENCOUNTER — APPOINTMENT (OUTPATIENT)
Dept: GENERAL RADIOLOGY | Age: 20
End: 2020-02-04
Payer: MEDICARE

## 2020-02-04 ENCOUNTER — HOSPITAL ENCOUNTER (EMERGENCY)
Age: 20
Discharge: HOME | End: 2020-02-04
Payer: MEDICARE

## 2020-02-04 VITALS
TEMPERATURE: 97.8 F | WEIGHT: 185 LBS | RESPIRATION RATE: 19 BRPM | OXYGEN SATURATION: 97 % | HEART RATE: 98 BPM | DIASTOLIC BLOOD PRESSURE: 73 MMHG | HEIGHT: 59 IN | BODY MASS INDEX: 37.29 KG/M2 | SYSTOLIC BLOOD PRESSURE: 116 MMHG

## 2020-02-04 LAB
ALBUMIN SERPL-MCNC: 4.1 G/DL (ref 3.5–5.1)
ALP BLD-CCNC: 86 U/L (ref 38–126)
ALT SERPL-CCNC: 11 U/L (ref 11–66)
AMPHETAMINE+METHAMPHETAMINE URINE SCREEN: NEGATIVE
ANION GAP SERPL CALCULATED.3IONS-SCNC: 14 MEQ/L (ref 8–16)
AST SERPL-CCNC: 15 U/L (ref 5–40)
BARBITURATE QUANTITATIVE URINE: NEGATIVE
BASOPHILS # BLD: 0.8 %
BASOPHILS ABSOLUTE: 0.1 THOU/MM3 (ref 0–0.1)
BENZODIAZEPINE QUANTITATIVE URINE: NEGATIVE
BILIRUB SERPL-MCNC: 0.2 MG/DL (ref 0.3–1.2)
BUN BLDV-MCNC: 13 MG/DL (ref 7–22)
CALCIUM SERPL-MCNC: 9.8 MG/DL (ref 8.5–10.5)
CANNABINOID QUANTITATIVE URINE: NEGATIVE
CHLORIDE BLD-SCNC: 101 MEQ/L (ref 98–111)
CO2: 22 MEQ/L (ref 23–33)
COCAINE METABOLITE QUANTITATIVE URINE: NEGATIVE
CREAT SERPL-MCNC: 0.7 MG/DL (ref 0.4–1.2)
EKG ATRIAL RATE: 99 BPM
EKG P AXIS: 31 DEGREES
EKG P-R INTERVAL: 134 MS
EKG Q-T INTERVAL: 342 MS
EKG QRS DURATION: 74 MS
EKG QTC CALCULATION (BAZETT): 438 MS
EKG R AXIS: 69 DEGREES
EKG T AXIS: 19 DEGREES
EKG VENTRICULAR RATE: 99 BPM
EOSINOPHIL # BLD: 3.5 %
EOSINOPHILS ABSOLUTE: 0.3 THOU/MM3 (ref 0–0.4)
ERYTHROCYTE [DISTWIDTH] IN BLOOD BY AUTOMATED COUNT: 12.5 % (ref 11.5–14.5)
ERYTHROCYTE [DISTWIDTH] IN BLOOD BY AUTOMATED COUNT: 41.2 FL (ref 35–45)
GFR SERPL CREATININE-BSD FRML MDRD: > 90 ML/MIN/1.73M2
GLUCOSE BLD-MCNC: 200 MG/DL (ref 70–108)
HCT VFR BLD CALC: 46.1 % (ref 37–47)
HEMOGLOBIN: 15.2 GM/DL (ref 12–16)
IMMATURE GRANS (ABS): 0.02 THOU/MM3 (ref 0–0.07)
IMMATURE GRANULOCYTES: 0.3 %
LIPASE: 13.2 U/L (ref 5.6–51.3)
LYMPHOCYTES # BLD: 42.9 %
LYMPHOCYTES ABSOLUTE: 3.2 THOU/MM3 (ref 1–4.8)
MCH RBC QN AUTO: 29.7 PG (ref 26–33)
MCHC RBC AUTO-ENTMCNC: 33 GM/DL (ref 32.2–35.5)
MCV RBC AUTO: 90 FL (ref 81–99)
MONOCYTES # BLD: 7.9 %
MONOCYTES ABSOLUTE: 0.6 THOU/MM3 (ref 0.4–1.3)
NUCLEATED RED BLOOD CELLS: 0 /100 WBC
OPIATES, URINE: NEGATIVE
OSMOLALITY CALCULATION: 279.6 MOSMOL/KG (ref 275–300)
OXYCODONE: NEGATIVE
PHENCYCLIDINE QUANTITATIVE URINE: NEGATIVE
PLATELET # BLD: 441 THOU/MM3 (ref 130–400)
PMV BLD AUTO: 9.7 FL (ref 9.4–12.4)
POTASSIUM SERPL-SCNC: 4.2 MEQ/L (ref 3.5–5.2)
PREGNANCY, URINE: NEGATIVE
RBC # BLD: 5.12 MILL/MM3 (ref 4.2–5.4)
SEG NEUTROPHILS: 44.6 %
SEGMENTED NEUTROPHILS ABSOLUTE COUNT: 3.3 THOU/MM3 (ref 1.8–7.7)
SODIUM BLD-SCNC: 137 MEQ/L (ref 135–145)
T4 FREE: 1.28 NG/DL (ref 0.93–1.76)
TOTAL PROTEIN: 8.1 G/DL (ref 6.1–8)
TROPONIN T: < 0.01 NG/ML
TROPONIN T: < 0.01 NG/ML
TSH SERPL DL<=0.05 MIU/L-ACNC: 4.74 UIU/ML (ref 0.4–4.2)
WBC # BLD: 7.5 THOU/MM3 (ref 4.8–10.8)

## 2020-02-04 PROCEDURE — 6370000000 HC RX 637 (ALT 250 FOR IP): Performed by: PHYSICIAN ASSISTANT

## 2020-02-04 PROCEDURE — 71045 X-RAY EXAM CHEST 1 VIEW: CPT

## 2020-02-04 PROCEDURE — 84484 ASSAY OF TROPONIN QUANT: CPT

## 2020-02-04 PROCEDURE — 83690 ASSAY OF LIPASE: CPT

## 2020-02-04 PROCEDURE — 80053 COMPREHEN METABOLIC PANEL: CPT

## 2020-02-04 PROCEDURE — 93005 ELECTROCARDIOGRAM TRACING: CPT | Performed by: RADIOLOGY

## 2020-02-04 PROCEDURE — 81025 URINE PREGNANCY TEST: CPT

## 2020-02-04 PROCEDURE — 80307 DRUG TEST PRSMV CHEM ANLYZR: CPT

## 2020-02-04 PROCEDURE — 84443 ASSAY THYROID STIM HORMONE: CPT

## 2020-02-04 PROCEDURE — 36415 COLL VENOUS BLD VENIPUNCTURE: CPT

## 2020-02-04 PROCEDURE — 84439 ASSAY OF FREE THYROXINE: CPT

## 2020-02-04 PROCEDURE — 99285 EMERGENCY DEPT VISIT HI MDM: CPT

## 2020-02-04 PROCEDURE — 85025 COMPLETE CBC W/AUTO DIFF WBC: CPT

## 2020-02-04 RX ADMIN — LIDOCAINE HYDROCHLORIDE: 20 SOLUTION ORAL; TOPICAL at 12:53

## 2020-02-04 ASSESSMENT — PAIN SCALES - GENERAL
PAINLEVEL_OUTOF10: 5
PAINLEVEL_OUTOF10: 6
PAINLEVEL_OUTOF10: 7

## 2020-02-04 ASSESSMENT — PAIN DESCRIPTION - LOCATION
LOCATION: CHEST

## 2020-02-04 ASSESSMENT — PAIN DESCRIPTION - ORIENTATION
ORIENTATION: MID
ORIENTATION: MID

## 2020-02-04 ASSESSMENT — PAIN DESCRIPTION - DESCRIPTORS
DESCRIPTORS: DISCOMFORT;DULL
DESCRIPTORS: DULL

## 2020-02-04 ASSESSMENT — ENCOUNTER SYMPTOMS
RHINORRHEA: 0
VOMITING: 1
SORE THROAT: 0
SHORTNESS OF BREATH: 1
PHOTOPHOBIA: 0
NAUSEA: 1
BACK PAIN: 0
ABDOMINAL PAIN: 0
DIARRHEA: 0

## 2020-02-04 ASSESSMENT — PAIN DESCRIPTION - PAIN TYPE
TYPE: ACUTE PAIN
TYPE: ACUTE PAIN

## 2020-02-04 NOTE — ED NOTES
Pt resting on cot talking to friends at bedside. VS stable. Pt appears to be in no acute distress. Pt updated on POC. WIll continue to monitor.       Mahendra Hernandez RN  02/04/20 3496

## 2020-02-04 NOTE — ED NOTES
Pt updated on POC. Pt denies needs at this time. VS stable. Pt talking with friends at bedside. Will continue to monitor.  Call light within easy reach      Alexy Christy RN  02/04/20 5405

## 2020-02-04 NOTE — ED PROVIDER NOTES
[facility] Emergency Department      CHIEF COMPLAINT       Chief Complaint   Patient presents with    Chest Pain       Nurses Notes reviewed and I agree except as noted in the HPI. HISTORY OF PRESENT ILLNESS    Alexis Dale is a 22 y.o. female who presents to the ED with complaints of mid-sternal chest pain which has been constant with initial onset of last evening. This pain worsens with exertion. Patient denies injury. She has a history of DM, depression, and anxiety and no significant history otherwise. This chest pain is a new symptom for her. Patient has taken Ibuprofen with minimal relief. She reports intermittent SOB, nausea, and dizziness since this pain began. She reports 1 episode of vomiting. She denies cough/cold symptoms. She denies fever/chills. She denies abdominal pain. Patient reports a separate complaint of right rib pain which is chronic. She states this has been intermittently present for \"years\". There was no initial injury. Patient is a nonsmoker. She denies drug or alcohol use. Patient rates his current pain at a 7/10 in severity which she describes as sharp. There are no other complaints, symptoms, or concerns at this time. Friends are at bedside. Location/Symptom: mid-sternal chest pain  Timing/Onset: last evening  Context/Setting: no cardiac history  Quality: sharp  Duration: constant  Modifying Factors: worse with exertion  Severity: 7/10    REVIEW OF SYSTEMS     Review of Systems   Constitutional: Negative for activity change, appetite change, chills and fever. Feels shaky   HENT: Negative for congestion, ear pain, rhinorrhea and sore throat. Eyes: Negative for photophobia. Respiratory: Positive for cough and shortness of breath. Cardiovascular: Positive for chest pain. Gastrointestinal: Positive for nausea and vomiting (x1). Negative for abdominal pain and diarrhea. Endocrine: Negative for polyuria.    Genitourinary: Negative for difficulty urinating, dysuria, flank pain and frequency. Musculoskeletal: Negative for back pain, gait problem and neck pain. Skin: Negative for rash. Neurological: Positive for dizziness and weakness. Negative for syncope, light-headedness and numbness. Psychiatric/Behavioral: Positive for confusion. Negative for sleep disturbance. PAST MEDICAL HISTORY    has a past medical history of Anxiety, Depression, and Diabetes mellitus ([facility] [address] SURGICAL HISTORY      has no past surgical history on file. CURRENT MEDICATIONS       Discharge Medication List as of 2/4/2020  2:51 PM      CONTINUE these medications which have NOT CHANGED    Details   Probiotic Product (PROBIOTIC PO) Take 1 capsule by mouth dailyHistorical Med      insulin aspart (NOVOLOG) 100 UNIT/ML injection vial Pt is IDDM on insulin pump at basal Carb to insulin ratio of 1.2/hr plus breakthroughs with meals to a total of approximately 60 units/ per day., Disp-1 vial, R-3Print                  is allergic to sulfa antibiotics. FAMILY HISTORY     has no family status information on file. family history is not on file. SOCIAL HISTORY      reports that she has never smoked. She has never used smokeless tobacco. She reports that she does not drink alcohol or use drugs. PHYSICAL EXAM     INITIAL VITALS:  height is 4' 11\" (1.499 m) and weight is 185 lb (83.9 kg). Her oral temperature is 97.8 °F (36.6 °C). Her blood pressure is 116/73 and her pulse is 98. Her respiration is 19 and oxygen saturation is 97%. Physical Exam  Vitals signs and nursing note reviewed. Constitutional:       General: She is not in acute distress. Appearance: Normal appearance. She is well-developed. She is not toxic-appearing or diaphoretic. HENT:      Head: Normocephalic and atraumatic. Right Ear: Hearing normal.      Left Ear: Hearing normal.      Nose: Nose normal. No rhinorrhea. Mouth/Throat:      Lips: Pink.       Mouth: Mucous membranes are moist.      Pharynx: Uvula midline. No oropharyngeal exudate. Eyes:      General: Lids are normal. No scleral icterus. Extraocular Movements: Extraocular movements intact. Conjunctiva/sclera: Conjunctivae normal.      Pupils: Pupils are equal, round, and reactive to light. Neck:      Musculoskeletal: Normal range of motion and neck supple. No neck rigidity. Vascular: No JVD. Trachea: Trachea normal. No tracheal deviation. Cardiovascular:      Rate and Rhythm: Normal rate and regular rhythm. Heart sounds: Normal heart sounds. No murmur. Pulmonary:      Effort: Pulmonary effort is normal. No respiratory distress. Breath sounds: Normal breath sounds. No stridor. No decreased breath sounds or wheezing. Chest:      Chest wall: Tenderness (reproducible right rib tenderness as well as mid-sternal tenderness) present. Abdominal:      General: Bowel sounds are normal. There is no distension. Palpations: Abdomen is soft. Abdomen is not rigid. There is no pulsatile mass. Tenderness: There is no abdominal tenderness. There is no right CVA tenderness, left CVA tenderness, guarding or rebound. Negative signs include Mcdowell's sign and McBurney's sign. Hernia: No hernia is present. Musculoskeletal: Normal range of motion. Comments: Movement normal as observed   Lymphadenopathy:      Cervical: No cervical adenopathy. Skin:     General: Skin is warm and dry. Coloration: Skin is not pale. Findings: No rash. Comments: No rash to chest wall   Neurological:      General: No focal deficit present. Mental Status: She is alert and oriented to person, place, and time. Gait: Gait normal.      Comments: No gross deficits observed   Psychiatric:         Mood and Affect: Mood normal.         Speech: Speech normal.         Behavior: Behavior normal.         Thought Content:  Thought content normal.         Cognition and Memory: Cognition normal.       DIFFERENTIAL DIAGNOSIS: 97% 98% 97%   Weight: 185 lb (83.9 kg)      Height: 4' 11\" (1.499 m)        1137 Labs and chest XR ordered    1239 Additional labs ordered. GI cocktail given    1336 Second Troponin in process    MDM:    Patient was seen by me in the emergency department for chest pain among multiple other symptoms, Some chronic some new. Physical exam revealed some reproducible chest pain but was otherwise benign. Old records are reviewed. Vital signs are stable. Appropriate labs and imaging were ordered. No significant abnormalities were noted pertaining to patient complaints. The patient has been observed. The patient remained stable in the ER with no respiratory distress noted. On reexam, respiratory effort remained normal and lungs were CTAB. The patient was nontoxic appearing with good vital signs. The patient was requesting to eat. I see no sinister etiology for patient's symptoms and deem appropriate for discharge. Patient was comfortable with that plan. I have given the patient strict written and verbal instructions about care at home, follow-up, and sign and symptoms of worsening of condition and they did verbalize understanding. CRITICAL CARE:   None    CONSULTS:  None    PROCEDURES:  None    FINALIMPRESSION      1. Chest pain, unspecified type    2. Multiple somatic complaints    3.  Diabetes mellitus, insulin dependent (IDDM), uncontrolled [facility])          DISPOSITION/PLAN   Discharge     PATIENT REFERRED TO:  Steph Connor MD  [address] [facility] [address] [place] [address]  [phone]    Schedule an appointment as soon as possible for a visit         DISCHARGE MEDICATIONS:  Discharge Medication List as of 2/4/2020  2:51 PM          (Please note that portions of this note were completed with a voice recognition program.  Efforts were made to edit the dictations but occasionally words are mis-transcribed.)    Scribe:by: Darline Yost, 2/4/20 12:23 PM Scribing for and in the presence of Cisco Fowler

## 2020-02-04 NOTE — ED TRIAGE NOTES
Pt presents to the ED with c/o midsternal chest pain that started last night. Pt appears to be in no acute distress on cot. Pts friends at bedside. Pt has no cardiac Hx. Pt is type 1 diabetic and reports using a bump - glucose 151 per her pump. EKG completed. IV access maintained. Pt alert and oriented x4. Will monitor.

## 2020-02-05 ASSESSMENT — ENCOUNTER SYMPTOMS: COUGH: 1

## 2020-10-28 ENCOUNTER — HOSPITAL ENCOUNTER (INPATIENT)
Age: 20
LOS: 3 days | Discharge: HOME | DRG: 751 | End: 2020-10-31
Attending: PSYCHIATRY & NEUROLOGY | Admitting: PSYCHIATRY & NEUROLOGY
Payer: MEDICARE

## 2020-10-28 PROBLEM — F33.9 MDD (MAJOR DEPRESSIVE DISORDER), RECURRENT EPISODE (HCC): Status: ACTIVE | Noted: 2020-10-28

## 2020-10-28 LAB
ACETAMINOPHEN LEVEL: < 5 UG/ML (ref 0–20)
ALBUMIN SERPL-MCNC: 4.3 G/DL (ref 3.5–5.1)
ALP BLD-CCNC: 81 U/L (ref 38–126)
ALT SERPL-CCNC: 37 U/L (ref 11–66)
AMPHETAMINE+METHAMPHETAMINE URINE SCREEN: NEGATIVE
ANION GAP SERPL CALCULATED.3IONS-SCNC: 14 MEQ/L (ref 8–16)
AST SERPL-CCNC: 33 U/L (ref 5–40)
BARBITURATE QUANTITATIVE URINE: NEGATIVE
BASOPHILS # BLD: 0.7 %
BASOPHILS ABSOLUTE: 0.1 THOU/MM3 (ref 0–0.1)
BENZODIAZEPINE QUANTITATIVE URINE: NEGATIVE
BILIRUB SERPL-MCNC: 0.3 MG/DL (ref 0.3–1.2)
BUN BLDV-MCNC: 11 MG/DL (ref 7–22)
CALCIUM SERPL-MCNC: 10.1 MG/DL (ref 8.5–10.5)
CANNABINOID QUANTITATIVE URINE: NEGATIVE
CHLORIDE BLD-SCNC: 100 MEQ/L (ref 98–111)
CO2: 23 MEQ/L (ref 23–33)
COCAINE METABOLITE QUANTITATIVE URINE: NEGATIVE
CREAT SERPL-MCNC: 0.8 MG/DL (ref 0.4–1.2)
EOSINOPHIL # BLD: 1 %
EOSINOPHILS ABSOLUTE: 0.1 THOU/MM3 (ref 0–0.4)
ERYTHROCYTE [DISTWIDTH] IN BLOOD BY AUTOMATED COUNT: 13.4 % (ref 11.5–14.5)
ERYTHROCYTE [DISTWIDTH] IN BLOOD BY AUTOMATED COUNT: 43.3 FL (ref 35–45)
GFR SERPL CREATININE-BSD FRML MDRD: 90 ML/MIN/1.73M2
GLUCOSE BLD-MCNC: 163 MG/DL (ref 70–108)
GLUCOSE BLD-MCNC: 73 MG/DL (ref 70–108)
HCT VFR BLD CALC: 45.2 % (ref 37–47)
HEMOGLOBIN: 15.1 GM/DL (ref 12–16)
IMMATURE GRANS (ABS): 0.03 THOU/MM3 (ref 0–0.07)
IMMATURE GRANULOCYTES: 0.3 %
LYMPHOCYTES # BLD: 25.4 %
LYMPHOCYTES ABSOLUTE: 2.6 THOU/MM3 (ref 1–4.8)
MCH RBC QN AUTO: 29.4 PG (ref 26–33)
MCHC RBC AUTO-ENTMCNC: 33.4 GM/DL (ref 32.2–35.5)
MCV RBC AUTO: 87.9 FL (ref 81–99)
MONOCYTES # BLD: 7.1 %
MONOCYTES ABSOLUTE: 0.7 THOU/MM3 (ref 0.4–1.3)
NUCLEATED RED BLOOD CELLS: 0 /100 WBC
OPIATES, URINE: NEGATIVE
OSMOLALITY CALCULATION: 276.8 MOSMOL/KG (ref 275–300)
OXYCODONE: NEGATIVE
PHENCYCLIDINE QUANTITATIVE URINE: NEGATIVE
PLATELET # BLD: 399 THOU/MM3 (ref 130–400)
PMV BLD AUTO: 10.3 FL (ref 9.4–12.4)
POTASSIUM REFLEX MAGNESIUM: 3.9 MEQ/L (ref 3.5–5.2)
PREGNANCY, SERUM: NEGATIVE
RBC # BLD: 5.14 MILL/MM3 (ref 4.2–5.4)
SALICYLATE, SERUM: < 0.3 MG/DL (ref 2–10)
SARS-COV-2, NAAT: NOT DETECTED
SEG NEUTROPHILS: 65.5 %
SEGMENTED NEUTROPHILS ABSOLUTE COUNT: 6.8 THOU/MM3 (ref 1.8–7.7)
SODIUM BLD-SCNC: 137 MEQ/L (ref 135–145)
TOTAL PROTEIN: 7.9 G/DL (ref 6.1–8)
TSH SERPL DL<=0.05 MIU/L-ACNC: 1.71 UIU/ML (ref 0.4–4.2)
WBC # BLD: 10.4 THOU/MM3 (ref 4.8–10.8)

## 2020-10-28 PROCEDURE — 83036 HEMOGLOBIN GLYCOSYLATED A1C: CPT

## 2020-10-28 PROCEDURE — 84703 CHORIONIC GONADOTROPIN ASSAY: CPT

## 2020-10-28 PROCEDURE — U0002 COVID-19 LAB TEST NON-CDC: HCPCS

## 2020-10-28 PROCEDURE — 82948 REAGENT STRIP/BLOOD GLUCOSE: CPT

## 2020-10-28 PROCEDURE — 85025 COMPLETE CBC W/AUTO DIFF WBC: CPT

## 2020-10-28 PROCEDURE — 6370000000 HC RX 637 (ALT 250 FOR IP): Performed by: PSYCHIATRY & NEUROLOGY

## 2020-10-28 PROCEDURE — G0480 DRUG TEST DEF 1-7 CLASSES: HCPCS

## 2020-10-28 PROCEDURE — 80053 COMPREHEN METABOLIC PANEL: CPT

## 2020-10-28 PROCEDURE — 84443 ASSAY THYROID STIM HORMONE: CPT

## 2020-10-28 PROCEDURE — 99285 EMERGENCY DEPT VISIT HI MDM: CPT

## 2020-10-28 PROCEDURE — 80307 DRUG TEST PRSMV CHEM ANLYZR: CPT

## 2020-10-28 PROCEDURE — 36415 COLL VENOUS BLD VENIPUNCTURE: CPT

## 2020-10-28 PROCEDURE — 1240000000 HC EMOTIONAL WELLNESS R&B

## 2020-10-28 RX ORDER — DEXTROSE MONOHYDRATE 25 G/50ML
12.5 INJECTION, SOLUTION INTRAVENOUS PRN
Status: DISCONTINUED | OUTPATIENT
Start: 2020-10-28 | End: 2020-10-29 | Stop reason: SDUPTHER

## 2020-10-28 RX ORDER — MAGNESIUM HYDROXIDE/ALUMINUM HYDROXICE/SIMETHICONE 120; 1200; 1200 MG/30ML; MG/30ML; MG/30ML
30 SUSPENSION ORAL EVERY 6 HOURS PRN
Status: DISCONTINUED | OUTPATIENT
Start: 2020-10-28 | End: 2020-10-31 | Stop reason: HOSPADM

## 2020-10-28 RX ORDER — IBUPROFEN 400 MG/1
400 TABLET ORAL EVERY 6 HOURS PRN
Status: DISCONTINUED | OUTPATIENT
Start: 2020-10-28 | End: 2020-10-31 | Stop reason: HOSPADM

## 2020-10-28 RX ORDER — HYDROXYZINE HYDROCHLORIDE 25 MG/1
50 TABLET, FILM COATED ORAL 3 TIMES DAILY PRN
Status: DISCONTINUED | OUTPATIENT
Start: 2020-10-28 | End: 2020-10-31 | Stop reason: HOSPADM

## 2020-10-28 RX ORDER — DEXTROSE MONOHYDRATE 50 MG/ML
100 INJECTION, SOLUTION INTRAVENOUS PRN
Status: DISCONTINUED | OUTPATIENT
Start: 2020-10-28 | End: 2020-10-31 | Stop reason: HOSPADM

## 2020-10-28 RX ORDER — TESTOSTERONE CYPIONATE 200 MG/ML
200 INJECTION INTRAMUSCULAR
COMMUNITY
End: 2020-11-05 | Stop reason: ALTCHOICE

## 2020-10-28 RX ORDER — TRAZODONE HYDROCHLORIDE 50 MG/1
50 TABLET ORAL NIGHTLY PRN
Status: DISCONTINUED | OUTPATIENT
Start: 2020-10-28 | End: 2020-10-31 | Stop reason: HOSPADM

## 2020-10-28 RX ORDER — ACETAMINOPHEN 325 MG/1
650 TABLET ORAL EVERY 4 HOURS PRN
Status: DISCONTINUED | OUTPATIENT
Start: 2020-10-28 | End: 2020-10-31 | Stop reason: HOSPADM

## 2020-10-28 RX ORDER — NICOTINE POLACRILEX 4 MG
15 LOZENGE BUCCAL PRN
Status: DISCONTINUED | OUTPATIENT
Start: 2020-10-28 | End: 2020-10-29 | Stop reason: SDUPTHER

## 2020-10-28 RX ADMIN — TRAZODONE HYDROCHLORIDE 50 MG: 50 TABLET ORAL at 22:36

## 2020-10-28 ASSESSMENT — SLEEP AND FATIGUE QUESTIONNAIRES
DO YOU HAVE DIFFICULTY SLEEPING: YES
DIFFICULTY STAYING ASLEEP: NO
DIFFICULTY ARISING: NO
SLEEP PATTERN: DIFFICULTY FALLING ASLEEP
DIFFICULTY FALLING ASLEEP: YES
DIFFICULTY FALLING ASLEEP: YES
DO YOU USE A SLEEP AID: COMMENT
RESTFUL SLEEP: NO
AVERAGE NUMBER OF SLEEP HOURS: 8
DO YOU USE A SLEEP AID: NO
RESTFUL SLEEP: NO
DIFFICULTY ARISING: NO
DO YOU HAVE DIFFICULTY SLEEPING: YES
SLEEP PATTERN: DIFFICULTY FALLING ASLEEP
DIFFICULTY STAYING ASLEEP: NO

## 2020-10-28 ASSESSMENT — PATIENT HEALTH QUESTIONNAIRE - PHQ9
SUM OF ALL RESPONSES TO PHQ QUESTIONS 1-9: 24
SUM OF ALL RESPONSES TO PHQ QUESTIONS 1-9: 26

## 2020-10-28 ASSESSMENT — PAIN SCALES - GENERAL
PAINLEVEL_OUTOF10: 0

## 2020-10-28 ASSESSMENT — LIFESTYLE VARIABLES: HISTORY_ALCOHOL_USE: NO

## 2020-10-28 NOTE — ACP (ADVANCE CARE PLANNING)
Advance Care Planning     Advance Care Planning Activator (Inpatient)  Conversation Note      Date of ACP Conversation: 10/28/2020    Conversation Conducted with: Patient with Sun [address]: Next of Kin by law (only applies in absence of above) (name) Parul Valladares    ACP Activator: [employer]    \"Who would you like to name as your primary health care decision-maker? \"               Name: Parul Valladares        Relationship: Parent          Phone number: [phone]  \"Can this person be reached easily? \" Yes  \"Who would you like to name as your back-up decision maker? \"   Name: Evelyne Ramirez        Relationship: Other          Phone number: [phone]  \"Can this person be reached easily? \" Yes    Care Preferences    Ventilation: \"If you were in your present state of health and suddenly became very ill and were unable to breathe on your own, what would your preference be about the use of a ventilator (breathing machine) if it were available to you? \"      Would the patient desire the use of ventilator (breathing machine)?: yes    \"If your health worsens and it becomes clear that your chance of recovery is unlikely, what would your preference be about the use of a ventilator (breathing machine) if it were available to you? \"     Would the patient desire the use of ventilator (breathing machine)?: No      Resuscitation  \"CPR works best to restart the heart when there is a sudden event, like a heart attack, in someone who is otherwise healthy. Unfortunately, CPR does not typically restart the heart for people who have serious health conditions or who are very sick. \"    \"In the event your heart stopped as a result of an underlying serious health condition, would you want attempts to be made to restart your heart (answer \"yes\" for attempt to resuscitate) or would you prefer a natural death (answer \"no\" for do not attempt to resuscitate)? \" Not sure at this time       [] Yes [x] No   Educated Patient / Ritesh Bridges regarding differences between Advance Directives and portable DNR orders. Length of ACP Conversation in minutes:  5 mins    Conversation Outcomes:  [] ACP discussion completed  [] Existing advance directive reviewed with patient; no changes to patient's previously recorded wishes  [] New Advance Directive completed  [] Portable Do Not Rescitate prepared for Provider review and signature  [] POLST/POST/MOLST/MOST prepared for Provider review and signature      Follow-up plan:    [] Schedule follow-up conversation to continue planning  [] Referred individual to Provider for additional questions/concerns   [] Advised patient/agent/surrogate to review completed ACP document and update if needed with changes in condition, patient preferences or care setting    [] This note routed to one or more involved healthcare providers     Patient was in the safe room and not able to discuss ACP.  Officer obtained answers to vent and CPR

## 2020-10-28 NOTE — ED NOTES
Patient placed in safe room that is ligature resistant with continuous monitoring in place. Provider notified, requested an assessment by behavioral health . Patient belongings secured in a locked lockers outside of the room. Explained suicide prevention precautions to the patient including constant observer.           Pb Mancuso [place]  01/95/09 4222

## 2020-10-28 NOTE — ED NOTES
Bed: 027A  Expected date:   Expected time:   Means of arrival: [facility]  Comments:     Nancie Ponce RN  10/28/20 7669

## 2020-10-28 NOTE — ED NOTES
Pt presents to the ed with [facility]. PT called her mental health advisor and was talking about the issues she was having. She called the squad to get the patient and help her get to the hospital. PT is calm and cooperative and states that she is having increased thoughts about suicidal ideation. PT denies any homicidal ideation. Pt denies any pain at this time. Level A paged to [facility].       Andrei LugoCentral, [place]  00/41/40 0451

## 2020-10-28 NOTE — PROGRESS NOTES
1800 Patient picked up from outgoing clinician. Patient has no concerns at this time. 1824 Patient informed that she is awaiting transport. Nurse informed transport needed. 1815 Nurse requested insulin information. ED nurse informed. ED nurse called report. 1922 Patient has no concern. Patient watching tv. Nursing staff informed patient awaiting transport.   1935 Nurse radioed transport  1944 Patient to be transported   [address] called 4E
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
## 2020-10-28 NOTE — ED NOTES
Pt appears restful and calm on cart. Swabbed for covid- tolerated well.  Will monitor     Jesenia Deng RN  10/28/20 1800

## 2020-10-29 PROBLEM — F41.1 ANXIETY STATE: Status: ACTIVE | Noted: 2020-10-29

## 2020-10-29 PROBLEM — R01.1 HEART MURMUR: Status: ACTIVE | Noted: 2020-10-29

## 2020-10-29 PROBLEM — E11.9 DIABETES MELLITUS (HCC): Status: ACTIVE | Noted: 2018-09-15

## 2020-10-29 PROBLEM — F33.2 SEVERE EPISODE OF RECURRENT MAJOR DEPRESSIVE DISORDER, WITHOUT PSYCHOTIC FEATURES (HCC): Status: ACTIVE | Noted: 2020-10-28

## 2020-10-29 PROBLEM — F32.A DEPRESSION: Status: ACTIVE | Noted: 2020-10-29

## 2020-10-29 LAB
AVERAGE GLUCOSE: 267 MG/DL (ref 70–126)
CREATININE, URINE: 36.3 MG/DL
GLUCOSE BLD-MCNC: 207 MG/DL (ref 70–108)
GLUCOSE BLD-MCNC: 214 MG/DL (ref 70–108)
GLUCOSE BLD-MCNC: 411 MG/DL (ref 70–108)
GLUCOSE BLD-MCNC: 464 MG/DL (ref 70–108)
HBA1C MFR BLD: 10.9 % (ref 4.4–6.4)
MICROALBUMIN UR-MCNC: 1.29 MG/DL
MICROALBUMIN/CREAT UR-RTO: 36 MG/G (ref 0–30)

## 2020-10-29 PROCEDURE — 82043 UR ALBUMIN QUANTITATIVE: CPT

## 2020-10-29 PROCEDURE — 6370000000 HC RX 637 (ALT 250 FOR IP): Performed by: INTERNAL MEDICINE

## 2020-10-29 PROCEDURE — 6370000000 HC RX 637 (ALT 250 FOR IP): Performed by: FAMILY MEDICINE

## 2020-10-29 PROCEDURE — 99253 IP/OBS CNSLTJ NEW/EST LOW 45: CPT | Performed by: FAMILY MEDICINE

## 2020-10-29 PROCEDURE — APPSS30 APP SPLIT SHARED TIME 16-30 MINUTES: Performed by: PHYSICIAN ASSISTANT

## 2020-10-29 PROCEDURE — 1240000000 HC EMOTIONAL WELLNESS R&B

## 2020-10-29 PROCEDURE — 6370000000 HC RX 637 (ALT 250 FOR IP): Performed by: PSYCHIATRY & NEUROLOGY

## 2020-10-29 PROCEDURE — 6370000000 HC RX 637 (ALT 250 FOR IP): Performed by: PHYSICIAN ASSISTANT

## 2020-10-29 PROCEDURE — 82948 REAGENT STRIP/BLOOD GLUCOSE: CPT

## 2020-10-29 PROCEDURE — 99223 1ST HOSP IP/OBS HIGH 75: CPT | Performed by: PSYCHIATRY & NEUROLOGY

## 2020-10-29 RX ORDER — DEXTROSE MONOHYDRATE 25 G/50ML
12.5 INJECTION, SOLUTION INTRAVENOUS PRN
Status: DISCONTINUED | OUTPATIENT
Start: 2020-10-29 | End: 2020-10-31 | Stop reason: HOSPADM

## 2020-10-29 RX ORDER — VENLAFAXINE HYDROCHLORIDE 37.5 MG/1
37.5 CAPSULE, EXTENDED RELEASE ORAL
Status: DISCONTINUED | OUTPATIENT
Start: 2020-10-29 | End: 2020-10-31 | Stop reason: HOSPADM

## 2020-10-29 RX ORDER — INSULIN GLARGINE 100 [IU]/ML
30 INJECTION, SOLUTION SUBCUTANEOUS 2 TIMES DAILY
Status: DISCONTINUED | OUTPATIENT
Start: 2020-10-29 | End: 2020-10-31 | Stop reason: HOSPADM

## 2020-10-29 RX ORDER — NICOTINE POLACRILEX 4 MG
15 LOZENGE BUCCAL PRN
Status: DISCONTINUED | OUTPATIENT
Start: 2020-10-29 | End: 2020-10-31 | Stop reason: HOSPADM

## 2020-10-29 RX ORDER — DEXTROSE MONOHYDRATE 50 MG/ML
100 INJECTION, SOLUTION INTRAVENOUS PRN
Status: DISCONTINUED | OUTPATIENT
Start: 2020-10-29 | End: 2020-10-31 | Stop reason: HOSPADM

## 2020-10-29 RX ADMIN — INSULIN GLARGINE 30 UNITS: 100 INJECTION, SOLUTION SUBCUTANEOUS at 12:52

## 2020-10-29 RX ADMIN — INSULIN GLARGINE 30 UNITS: 100 INJECTION, SOLUTION SUBCUTANEOUS at 22:00

## 2020-10-29 RX ADMIN — VENLAFAXINE HYDROCHLORIDE 37.5 MG: 37.5 CAPSULE, EXTENDED RELEASE ORAL at 13:20

## 2020-10-29 RX ADMIN — INSULIN LISPRO 6 UNITS: 100 INJECTION, SOLUTION INTRAVENOUS; SUBCUTANEOUS at 07:49

## 2020-10-29 RX ADMIN — TRAZODONE HYDROCHLORIDE 50 MG: 50 TABLET ORAL at 22:00

## 2020-10-29 RX ADMIN — INSULIN LISPRO 6 UNITS: 100 INJECTION, SOLUTION INTRAVENOUS; SUBCUTANEOUS at 11:53

## 2020-10-29 ASSESSMENT — PATIENT HEALTH QUESTIONNAIRE - PHQ9: SUM OF ALL RESPONSES TO PHQ QUESTIONS 1-9: 24

## 2020-10-29 ASSESSMENT — PAIN SCALES - GENERAL: PAINLEVEL_OUTOF10: 0

## 2020-10-29 NOTE — BH NOTE
Behavioral Health   Admission Note     Admission Type:   Admission Type: Involuntary    Reason for admission:  Reason for Admission: Major Depressive disorder    PATIENT STRENGTHS:  Strengths: Positive Support    Patient Strengths and Limitations:  Limitations: Difficulty problem solving/relies on others to help solve problems    Addictive Behavior:   Addictive Behavior  In the past 3 months, have you felt or has someone told you that you have a problem with:  : None  Do you have a history of Chemical Use?: No  Do you have a history of Alcohol Use?: No  Do you have a history of Street Drug Abuse?: No  Histroy of Prescripton Drug Abuse?: No    Medical Problems:   Past Medical History:   Diagnosis Date    Anxiety     Depression     Diabetes mellitus (HCC)        Status EXAM:  Status and Exam  Normal: No  Facial Expression: Flat, Brightened  Affect: Appropriate  Level of Consciousness: Alert  Mood:Normal: Yes  Motor Activity:Normal: Yes  Interview Behavior: Cooperative  Preception: [place] to Person, [place] to Time, [place] to Place, [place] to Situation  Attention:Normal: Yes  Thought Processes: Circumstantial  Thought Content:Normal: Yes  Hallucinations: Auditory (Comment)  Delusions: No  Memory:Normal: Yes  Insight and Judgment: No  Insight and Judgment: Poor Judgment, Poor Insight  Present Suicidal Ideation: Yes  Present Homicidal Ideation: No    Pt admitted with followings belongings:  Dentures: None  Vision - Corrective Lenses: Glasses  Hearing Aid: None  Jewelry: Earrings  Body Piercings Removed: Yes(eyebrow and tongue)  Clothing: Footwear, Jacket / coat, Pants, Socks, Undergarments (Comment)  Were All Patient Medications Collected?: Yes  Other Valuables: Cell phone, Money (Comment), Yury Fall, Other (Comment)(bookbag)     Admission order obtained yes. Valuables sent home with no. Valuables placed in safe in security envelope, number:  no. Patient's home medications were  narc cabinet # [id number].   Patient

## 2020-10-29 NOTE — PROGRESS NOTES
Group Therapy Note    Date: 10/29/2020  Start Time: 1330  End Time:  9350  Number of Participants: 11    Type of Group: Psychotherapy      Notes:  Pt is present for group. Peers discussed common experiences related to the don't talk, don't feel, don't trust and don't touch rules learned early in their lives. Peers shared common experiences with how those rules as adults contribute to emotional distress and not seeking help. Pt Related the topic to childhood and present life experiences. Status After Intervention:  Improved    Participation Level:  Active Listener and Interactive    Participation Quality: Appropriate, Attentive, Sharing and Supportive      Speech:  normal      Thought Process/Content: Logical  Linear      Affective Functioning: Congruent      Mood: dysphoric      Level of consciousness:  Alert, Oriented x4 and Attentive      Response to Learning: Able to verbalize current knowledge/experience, Able to verbalize/acknowledge new learning, Able to retain information, Capable of insight, Able to change behavior and Progressing to goal      Endings: None Reported    Modes of Intervention: Education, Support, Socialization, Exploration, Clarifying, Problem-solving and Activity      Discipline Responsible: /Counselor      Signature:  SORAYA Barajas

## 2020-10-29 NOTE — BH NOTE
Patient was unable to attend the goal group this morning due to being with the PA at time of the group.

## 2020-10-29 NOTE — PROGRESS NOTES
Hospitalist    Reviewed insulin use with pt; she was taking an average of total 80 units insulin per day ( basal and bolus) when using her pump; however out of pods    willl try mimic with lantus and humalog. Her A1C  Was 10.9. Will see if her endocrinologist is available. She had an eye exam recently which was ok.

## 2020-10-29 NOTE — CONSULTS
CONSULT HISTORY & PHYSICAL     Patient:  Alonzo Nunez  YOB: 2000    MRN: [id number]     Acct: [de-identified]      ASSESSMENT:    Active Hospital Problems    Diagnosis Date Noted    MDD (major depressive disorder), recurrent episode ([facility] [F33.9] 10/28/2020       PLAN:    IDDM1, uncontrolled?  -patient's glucose 73 in ED, reportedly is typically in 200s-300s, hasn't had pods for insulin pump for over a week  -sliding scale insulin (target glucose 140-180 inpatient)  -poc glucose checks  -hemoglobin A1c  -hypoglycemia protocol  -carb control diet  -consult case management for help with resources    MDD  -admitted to psych  -management per primary    Chief Complaint:  Diabetes management    Date of Service: Pt seen/examined in consultation on 10/29/20    History Of Present Illness:      23 y.o. female who we are asked to see/evaluate by Kandy Echols, * for medical management of diabetes. Patient is transgender and transitioning. Prefers the pronouns he/him/his. Patient is a type I diabetic. He has an insulin pump but has been having trouble getting the pods for it due to insurance issues. In the ED POC glucose was 73. He states that blood sugars are typically in the 200s-300s sometimes 350s. He has had some nausea but no vomiting. No abdominal pain. Patient's insulin pump was reportedly at a basal Carb to insulin ratio of 1.2/hr plus breakthroughs with meals to a total of approximately 60 units/ per day. Past Medical History:        Diagnosis Date    Anxiety     Depression     Diabetes mellitus ([facility]        Past Surgical History:    History reviewed. No pertinent surgical history. Medications Prior to Admission:    Prior to Admission medications    Medication Sig Start Date End Date Taking? Authorizing Provider   testosterone cypionate (DEPOTESTOTERONE CYPIONATE) 200 MG/ML injection Inject 200 mg into the muscle every 7 days.  Every Tuesday   Yes Historical Provider, MD insulin aspart (NOVOLOG) 100 UNIT/ML injection vial Pt is IDDM on insulin pump at basal Carb to insulin ratio of 1.2/hr plus breakthroughs with meals to a total of approximately 60 units/ per day. Patient taking differently: Indications: pt out of pods Pt is IDDM on insulin pump at basal Carb to insulin ratio of 1.2/hr plus breakthroughs with meals to a total of approximately 80 units/ per day. 9/17/18  Yes Lamine Bunn MD       Allergies:  Sulfa antibiotics    Social History:        TOBACCO:   reports that she has never smoked. She has never used smokeless tobacco.  ETOH:   reports no history of alcohol use. Family History:     Reviewed in detail and negative for DM, CAD, Cancer, CVA. Positive as follows:        Problem Relation Age of Onset    Atrial Fibrillation Mother     Other Father     Cancer Maternal Aunt     Breast Cancer Maternal Aunt     Depression Maternal Aunt     Diabetes Maternal Grandmother        Diet:  DIET CARB CONTROL;    REVIEW OF SYSTEMS:   Pertinent positives as noted in the HPI. All other systems reviewed and negative. PHYSICAL EXAM:  /62   Pulse 119   Temp 97.5 °F (36.4 °C) (Tympanic)   Resp 18   Ht 4' 6.75\" (1.391 m)   Wt 199 lb (90.3 kg)   LMP 10/01/2020   SpO2 98%   BMI 46.68 kg/m²   General appearance: No apparent distress, appears stated age and cooperative. HEENT: Normal cephalic, atraumatic without obvious deformity. Pupils equal, round, and reactive to light. Extra ocular muscles intact. Conjunctivae/corneas clear. Neck: Supple, with full range of motion. Respiratory:  Normal respiratory effort. Clear to auscultation, bilaterally without Rales/Wheezes/Rhonchi. Cardiovascular: Regular rate and rhythm with normal S1/S2 without murmurs, rubs or gallops. Abdomen: Soft, non-tender, non-distended with normal bowel sounds. Musculoskeletal: No clubbing, cyanosis or edema bilaterally.   Skin: Skin color, texture, turgor normal.  No rashes or lesions. Neurologic:  Neurovascularly intact without any focal sensory/motor deficits. Psychiatric: Alert and oriented, thought content appropriate, normal insight  Capillary Refill: Brisk,< 3 seconds   Peripheral Pulses: +2 palpable, equal bilaterally     Labs:     Recent Labs     10/28/20  1630   WBC 10.4   HGB 15.1   HCT 45.2        Recent Labs     10/28/20  1629      K 3.9      CO2 23   BUN 11   CREATININE 0.8   CALCIUM 10.1     Recent Labs     10/28/20  1629   AST 33   ALT 37   BILITOT 0.3   ALKPHOS 81     No results for input(s): INR in the last 72 hours. No results for input(s): Rashi Juanito in the last 72 hours. Urinalysis:    Lab Results   Component Value Date    NITRU NEGATIVE 09/15/2018    WBCUA 5-10 09/15/2018    BACTERIA FEW 09/15/2018    RBCUA 3-5 09/15/2018    BLOODU NEGATIVE 09/15/2018    SPECGRAV 1.020 11/16/2014    GLUCOSEU >= 1000 09/15/2018       Radiology: I have reviewed the radiology reports with the following interpretation:     No orders to display          EKG:  I have reviewed the EKG with the following interpretation: n/a    DVT prophylaxis: [] Lovenox                                 [] SCDs                                 [] SQ Heparin                                 [x] Encourage ambulation           [] Already on Anticoagulation      Code Status: Full Code    PT/OT Eval Status: Active and ongoing      Thank you for the consultation.     Electronically signed by Litzy Cristina MD on 10/29/2020 at 4:24 AM

## 2020-10-29 NOTE — PATIENT CARE CONFERENCE
[facility]  Initial Interdisciplinary Treatment Plan NOTE    Review Date & Time: 10/29/20 1024    Patient was in treatment team.  See Multidisciplinary Treatment Team sheet for participants. Admission Type:   Admission Type: Involuntary    Reason for admission:  Reason for Admission: Major Depressive disorder      Estimated Length of Stay Update:  3-5 days  Estimated Discharge Date Update: 3-5 days    PATIENT STRENGTHS:  Patient Strengths Strengths: Positive Support  Patient Strengths and Limitations:Limitations: Difficulty problem solving/relies on others to help solve problems  Addictive Behavior:Addictive Behavior  In the past 3 months, have you felt or has someone told you that you have a problem with:  : None  Do you have a history of Chemical Use?: No  Do you have a history of Alcohol Use?: No  Do you have a history of Street Drug Abuse?: No  Histroy of Prescripton Drug Abuse?: No  Medical Problems:  Past Medical History:   Diagnosis Date    Anxiety     Depression     Diabetes mellitus ([facility]        EDUCATION:   Learner Progress Toward Treatment Goals: Reviewed results and recommendations of this team, Reviewed group plan and strategies, Reviewed signs, symptoms and risk of self harm and violent behavior and Reviewed goals and plan of care    Method: Individual    Outcome: Verbalized understanding and Needs reinforcement    PATIENT GOALS: Improve Suicidal Thoughts, Improve Impulses to Self-Harm, Understand Triggers    PLAN/TREATMENT RECOMMENDATIONS UPDATE:   1. What is the most important thing we can help you with while you are here?  -  Improve Suicidal Thoughts, Improve Impulses to Self-Harm, Understand Triggers  2. Who is your support system?  - Partner, friend group   3. Do you have follow-up providers?  - [facility] primarily but is willing to see other counselors   4. Do you have the ability to pay for your medications? - Yes - [employer]  5.  Where will you be residing when you leave the hospital?  - Return to campus with roommates  6.  Will need a return to work slip or FMLA paper completion?  - No      GOALS UPDATE:   Time frame for Short-Term Goals: Daily     SORAYA Malhotra

## 2020-10-29 NOTE — BH NOTE
INPATIENT RECREATIONAL THERAPY  ADULT BEHAVIORAL SERVICES  EVALUATION    REFERRING PHYSICIAN:    Dr. Asuncion Johnson  DIAGNOSIS:    Major Depressive Disorder, Recurrent Episode  PRECAUTIONS:   Standard precautions    HISTORY OF PRESENT ILLNESS/INJURY:    Patient was admitted to the unit due to suicidal ideation and depression. Patient also stated that she was having auditory hallucinations. Patient reported having thoughts of hanging or shooting himself prior to admission. Patient stated that he has stress due to transgendering from female to male and that his mother does not accept it. Patient also reported some relationship stress with roommates at [employer] and his classes can be stressful. Patient prefers to be called \"he\" and likes to be addressed as Patton Shave. \"    PMH:  Please see medical chart for prior medical history, allergies, and medication    HISTORY OF PSYCHIATRIC TREATMENT:  OP:  [facility]    DATE OF BIRTH:   [birth year]  GENDER:   Female but transgendering into a male. MARITAL STATUS:  Patient reported that he has a significant other. EMPLOYMENT STATUS:  Patient is currently unemployed. 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 Patient reported relationship stress with roommates but also stated that they are his support system. Patient's significant other is also supportive. EDUCATIONAL LEVEL:  Patient is currently at student in [employer] at [employer] . MEDICATION/DRUG USE:  Medication compliance. LEISURE INTERESTS:  activities with his significant other, family activities, arts/crafts, pet care (cats), playing cards and games, listening to music, activities with his friends, watching TV and movies  ACTIVITY PREFERENCE:  No preference  ACTIVITY TYPES:   Passive. Indoor. Outdoor. Active. COGNITION:  A&Ox4 - reported auditory hallucinations.     COPING:   Poor coping skills  ATTENTION:  Cliff Ma
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
## 2020-10-29 NOTE — BH NOTE
Group Therapy Note    Date: 10/29/2020  Start Time:  1000  End Time:   7687    Number of Participants:  8    Type of Group: Recreational/Social    Notes:  Patient decorated a pumpkin for Halloween and played TOM with some of the other patients on the unit. Patient was social with others during group.     Status After Intervention:  Improved    Participation Level: Interactive    Participation Quality: Appropriate, Attentive and Sharing      Speech:  normal      Thought Process/Content: Logical      Affective Functioning: Congruent      Mood: euthymic      Level of consciousness:  Oriented x4      Response to Learning: Progressing to goal      Endings: None Reported    Modes of Intervention: Socialization and Activity      Discipline Responsible: Psychoeducational Specialist      Signature:  Gerry Griffin

## 2020-10-30 LAB
GLUCOSE BLD-MCNC: 113 MG/DL (ref 70–108)
GLUCOSE BLD-MCNC: 137 MG/DL (ref 70–108)
GLUCOSE BLD-MCNC: 229 MG/DL (ref 70–108)
GLUCOSE BLD-MCNC: 266 MG/DL (ref 70–108)

## 2020-10-30 PROCEDURE — 1240000000 HC EMOTIONAL WELLNESS R&B

## 2020-10-30 PROCEDURE — 82948 REAGENT STRIP/BLOOD GLUCOSE: CPT

## 2020-10-30 PROCEDURE — 6370000000 HC RX 637 (ALT 250 FOR IP): Performed by: INTERNAL MEDICINE

## 2020-10-30 PROCEDURE — APPSS30 APP SPLIT SHARED TIME 16-30 MINUTES: Performed by: PHYSICIAN ASSISTANT

## 2020-10-30 PROCEDURE — 90833 PSYTX W PT W E/M 30 MIN: CPT | Performed by: PSYCHIATRY & NEUROLOGY

## 2020-10-30 PROCEDURE — 6370000000 HC RX 637 (ALT 250 FOR IP): Performed by: PHYSICIAN ASSISTANT

## 2020-10-30 PROCEDURE — 99232 SBSQ HOSP IP/OBS MODERATE 35: CPT | Performed by: PSYCHIATRY & NEUROLOGY

## 2020-10-30 PROCEDURE — 6370000000 HC RX 637 (ALT 250 FOR IP): Performed by: PSYCHIATRY & NEUROLOGY

## 2020-10-30 PROCEDURE — 99232 SBSQ HOSP IP/OBS MODERATE 35: CPT | Performed by: INTERNAL MEDICINE

## 2020-10-30 RX ORDER — LISINOPRIL 5 MG/1
5 TABLET ORAL DAILY
Status: DISCONTINUED | OUTPATIENT
Start: 2020-10-30 | End: 2020-10-31

## 2020-10-30 RX ADMIN — LISINOPRIL 5 MG: 5 TABLET ORAL at 10:47

## 2020-10-30 RX ADMIN — VENLAFAXINE HYDROCHLORIDE 37.5 MG: 37.5 CAPSULE, EXTENDED RELEASE ORAL at 07:37

## 2020-10-30 RX ADMIN — INSULIN GLARGINE 30 UNITS: 100 INJECTION, SOLUTION SUBCUTANEOUS at 07:38

## 2020-10-30 RX ADMIN — TRAZODONE HYDROCHLORIDE 50 MG: 50 TABLET ORAL at 21:53

## 2020-10-30 RX ADMIN — ACETAMINOPHEN 650 MG: 325 TABLET ORAL at 15:53

## 2020-10-30 RX ADMIN — INSULIN GLARGINE 30 UNITS: 100 INJECTION, SOLUTION SUBCUTANEOUS at 21:53

## 2020-10-30 ASSESSMENT — ENCOUNTER SYMPTOMS
PHOTOPHOBIA: 0
NAUSEA: 0
BACK PAIN: 0
TROUBLE SWALLOWING: 0
ABDOMINAL PAIN: 0
SINUS PAIN: 0
WHEEZING: 0
SINUS PRESSURE: 0
COUGH: 0
VOMITING: 0
APNEA: 0
CONSTIPATION: 0
CHEST TIGHTNESS: 0
DIARRHEA: 0
SORE THROAT: 0
FACIAL SWELLING: 0
RHINORRHEA: 0
SHORTNESS OF BREATH: 0
COLOR CHANGE: 0
ABDOMINAL DISTENTION: 0

## 2020-10-30 ASSESSMENT — PAIN SCALES - GENERAL
PAINLEVEL_OUTOF10: 4
PAINLEVEL_OUTOF10: 0
PAINLEVEL_OUTOF10: 1

## 2020-10-30 NOTE — CONSULTS
history of Anxiety, Depression, and Diabetes mellitus ([facility] [address] has no past surgical history on file. reports that he has never smoked. He has never used smokeless tobacco. He reports that he does not drink alcohol or use drugs. family history includes Atrial Fibrillation in his mother; Breast Cancer in his maternal aunt; Cancer in his maternal aunt; Depression in his maternal aunt; Diabetes in his maternal grandmother; Other in his father. Allergies   Allergen Reactions    Sulfa Antibiotics Swelling     Family member has severe allergic reaction so pt. Doesn't take per recommendation?      Current Facility-Administered Medications   Medication Dose Route Frequency Provider Last Rate Last Dose    insulin lispro (HUMALOG) injection vial 5 Units  5 Units Subcutaneous TID [facility], DO   5 Units at 10/30/20 1121    lisinopril (PRINIVIL;ZESTRIL) tablet 5 mg  5 mg Oral Daily [facility], DO   5 mg at 10/30/20 1047    insulin glargine (LANTUS) injection vial 30 Units  30 Units Subcutaneous BID Anthony Proctor MD   30 Units at 10/30/20 0738    glucose (GLUTOSE) 40 % oral gel 15 g  15 g Oral PRN Anthony Proctor MD        dextrose 50 % IV solution  12.5 g Intravenous PRN Anthony Proctor MD        glucagon (rDNA) injection 1 mg  1 mg Intramuscular PRN Anthony Proctor MD        dextrose 5 % solution  100 mL/hr Intravenous PRN Anthony Proctor MD        insulin lispro (HUMALOG) injection vial 0-12 Units  0-12 Units Subcutaneous TID [facility] Antohny Proctor MD   5 Units at 10/30/20 1120    insulin lispro (HUMALOG) injection vial 0-6 Units  0-6 Units Subcutaneous Nightly Anthony Proctor MD   2 Units at 10/29/20 2200    venlafaxine (EFFEXOR XR) extended release capsule 37.5 mg  37.5 mg Oral Daily with breakfast Maria L Javier PA-C   37.5 mg at 10/30/20 0737    acetaminophen (TYLENOL) tablet 650 mg  650 mg Oral Q4H PRN Alton Martines MD        ibuprofen (ADVIL;MOTRIN) tablet 400 mg  400 mg Oral Q6H PRN Chuck Shelter MD Kaity        hydrOXYzine (ATARAX) tablet 50 mg  50 mg Oral TID PRN Angelina Sanchez MD        traZODone (DESYREL) tablet 50 mg  50 mg Oral Nightly PRN Angelina Sanchez MD   50 mg at 10/29/20 2200    magnesium hydroxide (MILK OF MAGNESIA) 400 MG/5ML suspension 30 mL  30 mL Oral Daily PRN Angelina Sanchez MD        aluminum & magnesium hydroxide-simethicone (MAALOX) 200-200-20 MG/5ML suspension 30 mL  30 mL Oral Q6H PRN Angelina Sanchez MD        dextrose 5 % solution  100 mL/hr Intravenous PRN Elaina Rosales MD           Objective:     Vitals:    10/30/20 0800   BP: (!) 162/96   Pulse: 107   Resp:    Temp:    SpO2: 93%    [unfilled] [unfilled] Body mass index is 46.68 kg/m². Wt Readings from Last 3 Encounters:   10/28/20 199 lb (90.3 kg)   02/04/20 185 lb (83.9 kg)   09/16/18 167 lb 15.9 oz (76.2 kg) (91 %, Z= 1.33)*     * Growth percentiles are based on CDC (Girls, 2-20 Years) data. Admission weight: 190 lb (86.2 kg)    General:  alert, appears stated age and cooperative obese   Oropharynx: lips, mucosa, and tongue normal; teeth and gums normal    Eyes:  conjunctivae/corneas clear. PERRL, EOM's intact. Fundi benign.     Ears:  normal TM's and external ear canals both ears   Neck: no adenopathy, no carotid bruit, no JVD, supple, symmetrical, trachea midline and thyroid not enlarged, symmetric, no tenderness/mass/nodules   Thyroid:  no palpable nodule   Lung: clear to auscultation bilaterallyPercussion: Normal   Heart:  regular rate and rhythm, S1, S2 normal, no murmur, click, rub or gallopApical impulse normal   Abdomen: soft, non-tender; bowel sounds normal; no masses,  no organomegaly   Extremities: extremities normal, atraumatic, no cyanosis or edema   Skin: warm and dry, no hyperpigmentation, vitiligo, or suspicious lesions   Pulses: 2+ and symmetric   Neuro: normal without focal findings, mental status, speech normal, alert and oriented x3, LOLA and reflexes normal and symmetric     Lab Review  [unfilled]  Lab Results   Component Value Date    TSH 1.710 10/28/2020    T4FREE 1.28 02/04/2020     No results found for: TESTOSTERONE  CBC:  Lab Results   Component Value Date    WBC 10.4 10/28/2020    RBC 5.14 10/28/2020    HGB 15.1 10/28/2020    HCT 45.2 10/28/2020    MCV 87.9 10/28/2020    MCH 29.4 10/28/2020    MCHC 33.4 10/28/2020    RDW 12.6 11/16/2014     10/28/2020    MPV 10.3 10/28/2020     CMP:    Lab Results   Component Value Date     10/28/2020    K 3.9 10/28/2020     10/28/2020    CO2 23 10/28/2020    BUN 11 10/28/2020    CREATININE 0.8 10/28/2020    GFRAA >60 11/16/2014    LABGLOM 90 10/28/2020    GLUCOSE 163 10/28/2020    PROT 7.9 10/28/2020    LABALBU 4.3 10/28/2020    CALCIUM 10.1 10/28/2020    BILITOT 0.3 10/28/2020    ALKPHOS 81 10/28/2020    AST 33 10/28/2020    ALT 37 10/28/2020     Hepatic Function Panel:    Lab Results   Component Value Date    ALKPHOS 81 10/28/2020    ALT 37 10/28/2020    AST 33 10/28/2020    PROT 7.9 10/28/2020    BILITOT 0.3 10/28/2020    BILIDIR <0.2 11/16/2014    IBILI 0.0 11/16/2014    LABALBU 4.3 10/28/2020     Magnesium:    Lab Results   Component Value Date    MG 1.8 09/17/2018     PT/INR:  No results found for: PROTIME, INR  HgBA1c:    Lab Results   Component Value Date    LABA1C 10.9 10/28/2020     TSH:    Lab Results   Component Value Date    TSH 1.710 10/28/2020   ,   No results for input(s): CKTOTAL, CKMB, CKMBINDEX, TROPONINI in the last 72 hours. FLP:  No results found for: TRIG, HDL, LDLCALC, LDLDIRECT, LABVLDL  DIET: DIET CARB CONTROL; Carb Control: 4 carb choices (60 gms)/meal  Assessment:   Principal Problem:    Severe episode of recurrent major depressive disorder, without psychotic features ([facility] [address]  Active Problems:    [facility]  Resolved Problems:    * No resolved hospital problems. *    Attempted Sucide   Diabetes Mellitus type 1, under poor control.       Plan:      Needs referral to [facility] at [facility] to be trained and institution of [address] you for asking us to see this complex patient. \"    Critical Care Time   60  min

## 2020-10-30 NOTE — PLAN OF CARE
Problem: Anxiety:  Goal: Level of anxiety will decrease  Description: Level of anxiety will decrease  10/30/2020 0858 by Gerson Black RN  Outcome: Met This Shift  Note: Pt not anxious   10/30/2020 0130 by Laureen Sharp RN  Outcome: Ongoing  Note: Pt denies anxiety or depression      Problem: Skin Integrity:  Goal: Will show no infection signs and symptoms  Description: Will show no infection signs and symptoms  10/30/2020 0858 by Gerson Black RN  Outcome: Met This Shift  10/30/2020 0130 by Laureen Sharp RN  Outcome: Ongoing  Note: Pt shows no signs of infection  Goal: Absence of new skin breakdown  Description: Absence of new skin breakdown  10/30/2020 0858 by Gerson Black RN  Outcome: Met This Shift  10/30/2020 0130 by Laureen Sharp RN  Outcome: Ongoing  Note: Pt has had no new breaks in skin     Problem: Suicide risk  Goal: Provide patient with safe environment  Description: Provide patient with safe environment  10/30/2020 0858 by Gerson Black RN  Outcome: Ongoing  Note: Maintained in safe and secure environment. 10/30/2020 0130 by Laureen Sharp RN  Outcome: Ongoing  Note: Pt remained safe and free from harm     Problem: Depressive Behavior With or Without Suicide Precautions:  Goal: Able to verbalize and/or display a decrease in depressive symptoms  Description: Able to verbalize and/or display a decrease in depressive symptoms  10/30/2020 0858 by Gerson Black RN  Outcome: Ongoing  Note: Pt affect bright and smiling. Pt laughing and very social with peers  10/30/2020 0130 by Laureen Sharp RN  Outcome: Ongoing  Note: Pt denies depression or anxiety  Goal: Able to verbalize support systems  Description: Able to verbalize support systems  10/30/2020 0858 by Gerson Black RN  Outcome: Ongoing  Note: Patient reports friends  as their support system.    10/30/2020 0130 by Laureen Sharp RN  Outcome: Ongoing  Note: Pt reports positive support system  Goal: Patient specific goal  Description: Patient specific goal  10/30/2020 0250 by Jose C Zamora RN  Outcome: Ongoing  Note: Patient reports goal for today is to stay positive   10/30/2020 0130 by Noé Thakkar RN  Outcome: Ongoing  Note: Pt met goal of weaning self harm thoughts  Goal: Participates in care planning  Description: Participates in care planning  10/30/2020 0858 by Jose C Zamora RN  Outcome: Ongoing  Note: Patient discussed treatment plan with physician/medical staff, attending group, and compliant with medications. 10/30/2020 0130 by Noé Thakkar RN  Outcome: Ongoing  Note: Pt is taking medications, attending and participating in groups and care planning. Pt has good interaction with staff and peers      Problem: Discharge Planning:  Goal: Discharged to appropriate level of care  Description: Discharged to appropriate level of care  10/30/2020 0858 by Jose C Zamora RN  Outcome: Ongoing  Note: Patient voices no needs before discharge. Patient plans to be discharged to back to college. Discharge planner working with patient to achieve optimal discharge plans, specific to individual needs. 10/30/2020 0130 by Noé Thakkar RN  Outcome: Ongoing  Note: Pt plans to be discharged back yo school and follow with counselor     Problem: GLYCEMIA IMBALANCE  Goal: Blood glucose control  Description: Maintain plasma glucose levels within expected range. 10/30/2020 0858 by Jose C Zamora RN  Outcome: Ongoing  10/30/2020 0130 by Noé Thakkar RN  Outcome: Ongoing  Note: Blood sugar 207 and appropriate coverage given  Goal: Knowledge - diabetes management  Description: Patient will verbalize knowledge about diabetes mellitus and its  control.   10/30/2020 0858 by Jose C Zamora RN  Outcome: Ongoing  10/30/2020 0130 by Noé Thakkar RN  Outcome: Ongoing  Note: Pt knowledgeable of diabetes process and treatment

## 2020-10-30 NOTE — PLAN OF CARE
Problem: Suicide risk  Goal: Provide patient with safe environment  Description: Provide patient with safe environment  Outcome: Ongoing  Note: Pt remained safe and free from harm     Problem: Depressive Behavior With or Without Suicide Precautions:  Goal: Able to verbalize and/or display a decrease in depressive symptoms  Description: Able to verbalize and/or display a decrease in depressive symptoms  Outcome: Ongoing  Note: Pt denies depression or anxiety  Goal: Able to verbalize support systems  Description: Able to verbalize support systems  Outcome: Ongoing  Note: Pt reports positive support system  Goal: Absence of self-harm  Description: Absence of self-harm  Outcome: Ongoing  Note: Pt denies self harm thoughts  Goal: Patient specific goal  Description: Patient specific goal  Outcome: Ongoing  Note: Pt met goal of weaning self harm thoughts  Goal: Participates in care planning  Description: Participates in care planning  Outcome: Ongoing  Note: Pt is taking medications, attending and participating in groups and care planning. Pt has good interaction with staff and peers      Problem: Discharge Planning:  Goal: Discharged to appropriate level of care  Description: Discharged to appropriate level of care  Outcome: Ongoing  Note: Pt plans to be discharged back yo school and follow with counselor     Problem:  Activity:  Goal: Physical symptoms of sleep deprivation will improve  Description: Physical symptoms of sleep deprivation will improve  Outcome: Ongoing  Note: Pt resting quietly in bed with no distress noted  Goal: Sleeping patterns will improve  Description: Sleeping patterns will improve  Outcome: Ongoing  Note: Pt resting quietly in bed with no distress noted     Problem: Anxiety:  Goal: Level of anxiety will decrease  Description: Level of anxiety will decrease  Outcome: Ongoing  Note: Pt denies anxiety or depression      Problem: Skin Integrity:  Goal: Will show no infection signs and symptoms  Description: Will show no infection signs and symptoms  Outcome: Ongoing  Note: Pt shows no signs of infection  Goal: Absence of new skin breakdown  Description: Absence of new skin breakdown  Outcome: Ongoing  Note: Pt has had no new breaks in skin     Problem: GLYCEMIA IMBALANCE  Goal: Blood glucose control  Description: Maintain plasma glucose levels within expected range. Outcome: Ongoing  Note: Blood sugar 207 and appropriate coverage given  Goal: Knowledge - diabetes management  Description: Patient will verbalize knowledge about diabetes mellitus and its  control. Outcome: Ongoing  Note: Pt knowledgeable of diabetes process and treatment     Problem: Coping:  Goal: Ability to identify problematic behaviors that deter socialization will improve  Description: Ability to identify problematic behaviors that deter socialization will improve  10/30/2020 0130 by Escobar Sequeira RN  Outcome: Ongoing  Note: Pt interacting well with peers  10/29/2020 1358 by Lara Marquez  Outcome: Met This Shift   Care plan reviewed with patient and verbalize understanding of the plan of care and contribute to goal setting.

## 2020-10-30 NOTE — PROGRESS NOTES
Hospitalist Consult Progress Note    Patient:  Mushtaq Yao      Unit/Bed:4E-55/055-A    YOB: 2000    MRN: [id number]       Acct: [de-identified]     PCP: Jamil Mendoza MD    Date of Admission: 10/28/2020    Attending Physician: Regino Aase, *    Reason for Consult: DM management      Assessment/Plan:  Active Hospital Problems    Diagnosis Date Noted    Severe episode of recurrent major depressive disorder, without psychotic features ([facility] [F33.2] 10/28/2020    Diabetes mellitus ([facility] [E11.9] 09/15/2018         T1DM: on insulin pump, ran out of pods. Basal bolus regimen while INP. -A1C 10.9%  -Lantus 30u BID, lispro 5u TID with meals + med dose SSI  -Carb controlled diet.   -Dr. Annmarie Polanco consulted for assistance - awaiting recommendations  -Likely will need lantus/lispro for backup and until pods can be filled  -SW for assistance with cost of pods    Tachycardia: may be related to glucose excursions, will monitor. TSH wnl. HTN: as noted below, will start on ACE for dual benefit of bp control; and albuminuria    Microalbuminuria: albumin/creat ratio 36 - should be on ACE/ARB but need to discuss pregnancy risks - no intention of becoming pregnant - is on testosterone (identifies as male)  -D/w patient-she is agreeable to start on ACE, start lisinopril 5mg daily. -preg test neg  -f/up as OP    MDD: management per Psychiatry - mood improved, no SI/HI currently (present on arrival)    Morbid Obesity: counseling on healthy lifestyle and weight loss. Consider for bariatric surgery when depression controlled. Thank you, Regino Aase, *, for the opportunity to participate in this patient's care. Expected discharge date: Per Primary - suspected in 1 day    Disposition:  Per Primary      -------------------------------------------------------------    Chief Complaint: SI, depression    Hospital Course: Patient admitted with the above complaint to inpatient psych. Normal range of motion in extremities. There is no joint swelling or tenderness. Skin: Skin color, texture, turgor normal.  No rashes or lesions. Neurologic:  Neurovascularly intact without any focal sensory/motor deficits in the upper and lower extremities. Cranial nerves:  grossly non-focal.  Psychiatric: Alert and oriented, thought content appropriate, normal insight. Labs:   Recent Labs     10/28/20  1630   WBC 10.4   HGB 15.1   HCT 45.2        Recent Labs     10/28/20  1629      K 3.9      CO2 23   BUN 11   CREATININE 0.8   CALCIUM 10.1     Recent Labs     10/28/20  1629   AST 33   ALT 37   BILITOT 0.3   ALKPHOS 81     No results for input(s): INR in the last 72 hours. No results for input(s): Miguel More in the last 72 hours. Microbiology:      Urinalysis:      Lab Results   Component Value Date    NITRU NEGATIVE 09/15/2018    WBCUA 5-10 09/15/2018    BACTERIA FEW 09/15/2018    RBCUA 3-5 09/15/2018    BLOODU NEGATIVE 09/15/2018    SPECGRAV 1.020 11/16/2014    GLUCOSEU >= 1000 09/15/2018       Radiology:  No orders to display         DVT prophylaxis: [] Lovenox                                  [] SCDs                                 [] SQ Heparin                                 [x] Encourage ambulation           [] Already on Anticoagulation     Code Status: Full Code    PT/OT Eval Status: na    Diet: carb    Tele:   [] yes             [x] no      Thank you, Sofia Castillo, *, for the opportunity to participate in this patient's care.        Electronically signed by Merlin Lagos, DO on 10/30/2020 at 7:15 AM

## 2020-10-30 NOTE — PATIENT CARE CONFERENCE
[facility]  Day 3 Interdisciplinary Treatment Plan NOTE    Review Date & Time: 10/30/2020  1004     Patient was in treatment team    Admission Type:   Admission Type: Involuntary    Reason for admission:  Reason for Admission: Major Depressive disorder  Estimated Length of Stay Update:  3-5 days  Estimated Discharge Date Update: 3-5 days    PATIENT STRENGTHS:  Patient Strengths Strengths: Positive Support  Patient Strengths and Limitations:Limitations: Difficulty problem solving/relies on others to help solve problems  Addictive Behavior:Addictive Behavior  In the past 3 months, have you felt or has someone told you that you have a problem with:  : None  Do you have a history of Chemical Use?: No  Do you have a history of Alcohol Use?: No  Do you have a history of Street Drug Abuse?: No  Histroy of Prescripton Drug Abuse?: No  Medical Problems:  Past Medical History:   Diagnosis Date    Anxiety     Depression     Diabetes mellitus ([facility]        Risk:  Fall RiskTotal: 53  Marco Scale Marco Scale Score: 22  BVC Total: 0  Change in scores none.  Changes to plan of Care none    Status EXAM:   Status and Exam  Normal: Yes  Facial Expression: Brightened  Affect: Appropriate  Level of Consciousness: Alert  Mood:Normal: Yes  Motor Activity:Normal: Yes  Interview Behavior: Cooperative  Preception: [place] to Person, [place] Merle to Time, [place] to Place, [place] to Situation  Attention:Normal: Yes  Thought Processes: Circumstantial  Thought Content:Normal: Yes  Hallucinations: None  Delusions: No  Memory:Normal: Yes  Insight and Judgment: No  Insight and Judgment: Poor Judgment  Present Suicidal Ideation: No  Present Homicidal Ideation: No    Daily Assessment Last Entry:   Daily Sleep (WDL): Within Defined Limits         Patient Currently in Pain: Denies  Daily Nutrition (WDL): Within Defined Limits    Patient Monitoring:  Frequency of Checks: 4 times per hour, close    Psychiatric Symptoms:   Depression Symptoms  Depression Symptoms: No problems reported or observed. Anxiety Symptoms  Anxiety Symptoms: No problems reported or observed. Moni Symptoms  Moni Symptoms: No problems reported or observed. Psychosis Symptoms  Delusion Type: No problems reported or observed. Suicide Risk CSSR-S:  1) Within the past month, have you wished you were dead or wished you could go to sleep and not wake up? : Yes  2) Have you actually had any thoughts of killing yourself? : Yes  3) Have you been thinking about how you might kill yourself? : No  5) Have you started to work out or worked out the details of how to kill yourself? Do you intend to carry out this plan? : No  6) Have you ever done anything, started to do anything, or prepared to do anything to end your life?: Yes  Change in Result none Change in Plan of care none      EDUCATION:   Learner Progress Toward Treatment Goals: Reviewed results and recommendations of this team, Reviewed group plan and strategies, Reviewed signs, symptoms and risk of self harm and violent behavior and Reviewed goals and plan of care    Method: Individual    Outcome: Verbalized understanding, Demonstrated Understanding and Needs reinforcement    PATIENT GOALS: Improve suicidal thoughts, Improve impulses to self-harm, understand triggers    PLAN/TREATMENT RECOMMENDATIONS UPDATE:  1. How are you progressing toward meeting your main treatment goal?   Patient feels that he is progressing \"pretty well\". Patient feels that current mental health medications are working better than previous medications. 2.  Are there discharge barriers/lingering problems that need to be addressed? Patient does not identify discharge barriers at this time. 3.  Do you have the ability to pay for your medications? Patient has [employer]       4. How is your group participation? Patient has been attending groups. Patient preferred Psychoeducational group over the spiritual group.  Patient shows insight that attending the groups has helped him while on the unit.     GOALS UPDATE:   Time frame for Short-Term Goals: Daily      Preethi Garcia

## 2020-10-30 NOTE — BH NOTE
Group Therapy Note    Date: 10/29/2020  Start Time: 2000  End Time:  2020  Number of Participants: 1    Type of Group: Wrap-Up    Wellness Binder Information  Module Name:    Session Number:      Patient's Goal:  Wean off thoughts of self harm    Notes:  met    Status After Intervention:  Improved    Participation Level: Active Listener    Participation Quality: Appropriate      Speech:  normal      Thought Process/Content: Logical      Affective Functioning: Congruent      Mood: bright      Level of consciousness:  Alert      Response to Learning: Able to verbalize current knowledge/experience      Endings: None Reported    Modes of Intervention: Education      Discipline Responsible: Registered Nurse      Signature:   Krishna Colorado RN

## 2020-10-30 NOTE — BH NOTE
PLAN OF CARE:     Start Time:  0900  End Time:   0930    Group Topic:  Daily Goals    Group Type:   Goal Group    Intervention/Goal:  To increase support and identify daily goals    Attendance:  Participated by identifying a daily goal    Affect:  bright    Behavior:     Cooperative and pleasant    Response:   appropriate    Daily Goal:    To keep my mood high/positive today.      Progress:  Progressing to goal

## 2020-10-30 NOTE — FLOWSHEET NOTE
Spiritual Support Group Note    Number of Participants in Group: 8                              Time: 1430    Goal: Relief from isolation and loneliness             Jossy Sharing             Self-understanding and gain insight              Acceptance and belonging            Recognize they are not alone                Socialization             Empowerment       Encouragement    Topic:  [x] Spiritual Wellness and Self Care                  [] Hope                     [x] Connecting with Divine/Others        [] Thankfulness and Gratitude               [x]  Meaningfulness and Purpose               [] Forgiveness               [] Peace               [] Connect to [employer]     [] Other:    Participation Level:   [x] Active Listener   [] Minimal   [] Monopolizing   [x] Interactive   [] No Participation   []  Other:     Attention:   [x] Alert   [] Distractible   [] Drowsy   [] Poor   [] Other:    Manner:   [x] Cooperative   [] Suspicious   [] Withdrawn   [] Guarded   [] Irritable   [] Inhospitable   [] Other:     Others Comments from Group: Bridgette Rosario participated in the spirituality group. He/she learned the following spiritual need:  Belonging, meaning and purpose, acceptance, values, awe. He/she has the  mini ways of wellness paper. She chose the spiritual tool of creativity and Humor.

## 2020-10-30 NOTE — PROGRESS NOTES
Department of Psychiatry  Progress Note     Chief Complaint:  Severe episode of recurrent major depressive disorder, without psychotic features ([address]     SUBJECTIVE   Rich reports he is feeling great today  He appears much brighter on examination. He rates his mood a 10 out of 10 with 10 being the best.  He states his mood was a 1-2 out of 10 upon admission. When asked what changed from yesterday, he states the medication \"feels like it pulled away the cloud. \"  Regarding his depression, he states \"I do not really feel any. \"  He states he has a tiny bit of anxiety about the management of his diabetes. He states he found protein in his urine and he is worried about that. He denies current thoughts of harm to himself or others  He feels his feelings of hopelessness and helplessness are improving  He reports he slept good last night. States the trazodone helped him \"knock out. \"  He states he still feels groggy this morning because he is not a morning person. Appetite has been good on the unit  He has been on the unit laughing and joking with peers. Has been attending groups and feels like they are going really well. He reports he talked to his partner on the phone last night and it went really well. He has 2 friends that are planning on coming to visit him on the unit today.     Suicidal ideations: denies    Compliance with medications: good   Medication side effects: absent  ROS: Patient has new complaints:  no  Sleep quality: 8.5 hours continuous  Attending groups: yes, reports they are going really well      OBJECTIVE      Medications  Current Facility-Administered Medications: lisinopril (PRINIVIL;ZESTRIL) tablet 5 mg, 5 mg, Oral, Daily  insulin lispro (HUMALOG) injection vial 5-15 Units, 5-15 Units, Subcutaneous, TID WC  insulin lispro (HUMALOG) injection vial 0-6 Units, 0-6 Units, Subcutaneous, TID WC  insulin lispro (HUMALOG) injection vial 0-3 Units, 0-3 Units, Subcutaneous, Nightly  insulin glargine Ideation:   Denies  Delusions:  no evidence of delusions  Perceptual Disturbance:  denies any perceptual disturbance  Cognition: Patient is oriented to person, place, time and situation  Concentration: clinically adequate  Memory: intact  Insight & Judgement: fair     ASSESSMENT     Severe episode of recurrent major depressive disorder, without psychotic features ([address]   Generalized anxiety disorder  R/o BPD    PLAN    Patient's symptoms show improvement today  Medication adjustments: Continue current medications as prescribed  Attempt to develop insight, psycho-education and supportive therapy conducted. Probable discharge: TBD  Follow-up: TBD    Electronically signed by Matt Hess PA-C on 10/30/2020 at 4:39 PM Reviewed patient's current plan of care and vital signs with nursing staff. Psychiatry Attending Attestation     I assessed this patient and reviewed the case and plan of care with Matt Hess PA-C. I have reviewed the above documentation and I agree with the findings and treatment plan with the following updates. Patient feels better than before. Mood and affect are better. Patient reports fleeting suicidal thoughts with no intent or plan. Patient notes that these thoughts are occurring less frequently. Denies any homicidal thoughts, that was explored with the patient. Oriented to time place and person. Recent and remote memory is intact. Patient feels hopeful. Sleep and appetite is good. No side effect from medication reported. Side-effect of medication were discussed with the patient . Patient is responding to current treatment. Discharge soon, if patient continues to show improvement. Case discussed with the staff. More than 16 mins of the session was spent doing Supportive psychotherapy and coordinating patient's care.  Session started at 8am and ended at 8:30am.     Electronically signed by Katina Reyes MD on 10/30/20 at 5:39 PM EDT    **This report has been created using voice recognition software. It may contain minor errors which are inherent in voice recognition technology. **

## 2020-10-30 NOTE — GROUP NOTE
Group Therapy Note    Date: 10/30/2020    Group Start Time: 1100  Group End Time: [phone]  Group Topic: Healthy Living/Wellness    [facility]    Clarke Shepherd LPN        Group Therapy Note    Attendees: 8        Notes:  attended    Status After Intervention:  Improved    Participation Level:  Active Listener    Participation Quality: Appropriate and Attentive      Speech:  normal      Thought Process/Content: Logical      Affective Functioning: Flat      Level of consciousness:  Alert, Oriented x4 and Attentive      Response to Learning: Able to verbalize/acknowledge new learning      Endings: None Reported    Modes of Intervention: Education and Support      Discipline Responsible: Licensed Practical Nurse      Signature:  Clarke Shepherd LPN

## 2020-10-31 VITALS
RESPIRATION RATE: 16 BRPM | BODY MASS INDEX: 46.05 KG/M2 | TEMPERATURE: 97.7 F | OXYGEN SATURATION: 98 % | HEART RATE: 105 BPM | SYSTOLIC BLOOD PRESSURE: 135 MMHG | DIASTOLIC BLOOD PRESSURE: 74 MMHG | WEIGHT: 199 LBS | HEIGHT: 55 IN

## 2020-10-31 LAB
CHOLESTEROL, TOTAL: 325 MG/DL (ref 100–199)
GLUCOSE BLD-MCNC: 100 MG/DL (ref 70–108)
GLUCOSE BLD-MCNC: 109 MG/DL (ref 70–108)
GLUCOSE BLD-MCNC: 148 MG/DL (ref 70–108)
GLUCOSE BLD-MCNC: 53 MG/DL (ref 70–108)
HDLC SERPL-MCNC: 48 MG/DL
LDL CHOLESTEROL CALCULATED: 228 MG/DL
TRIGL SERPL-MCNC: 243 MG/DL (ref 0–199)

## 2020-10-31 PROCEDURE — 5130000000 HC BRIDGE APPOINTMENT

## 2020-10-31 PROCEDURE — 84403 ASSAY OF TOTAL TESTOSTERONE: CPT

## 2020-10-31 PROCEDURE — G0008 ADMIN INFLUENZA VIRUS VAC: HCPCS | Performed by: PSYCHIATRY & NEUROLOGY

## 2020-10-31 PROCEDURE — 36415 COLL VENOUS BLD VENIPUNCTURE: CPT

## 2020-10-31 PROCEDURE — 90686 IIV4 VACC NO PRSV 0.5 ML IM: CPT | Performed by: PSYCHIATRY & NEUROLOGY

## 2020-10-31 PROCEDURE — 82948 REAGENT STRIP/BLOOD GLUCOSE: CPT

## 2020-10-31 PROCEDURE — 99232 SBSQ HOSP IP/OBS MODERATE 35: CPT | Performed by: INTERNAL MEDICINE

## 2020-10-31 PROCEDURE — 6370000000 HC RX 637 (ALT 250 FOR IP): Performed by: INTERNAL MEDICINE

## 2020-10-31 PROCEDURE — APPSS15 APP SPLIT SHARED TIME 0-15 MINUTES: Performed by: NURSE PRACTITIONER

## 2020-10-31 PROCEDURE — 99239 HOSP IP/OBS DSCHRG MGMT >30: CPT | Performed by: PSYCHIATRY & NEUROLOGY

## 2020-10-31 PROCEDURE — 6370000000 HC RX 637 (ALT 250 FOR IP): Performed by: PHYSICIAN ASSISTANT

## 2020-10-31 PROCEDURE — 80061 LIPID PANEL: CPT

## 2020-10-31 PROCEDURE — 84681 ASSAY OF C-PEPTIDE: CPT

## 2020-10-31 PROCEDURE — 6360000002 HC RX W HCPCS: Performed by: PSYCHIATRY & NEUROLOGY

## 2020-10-31 RX ORDER — ATORVASTATIN CALCIUM 40 MG/1
40 TABLET, FILM COATED ORAL NIGHTLY
Status: DISCONTINUED | OUTPATIENT
Start: 2020-10-31 | End: 2020-10-31 | Stop reason: HOSPADM

## 2020-10-31 RX ORDER — VENLAFAXINE HYDROCHLORIDE 37.5 MG/1
37.5 CAPSULE, EXTENDED RELEASE ORAL
Qty: 30 CAPSULE | Refills: 0 | Status: SHIPPED | OUTPATIENT
Start: 2020-11-01 | End: 2020-10-31

## 2020-10-31 RX ORDER — TRAZODONE HYDROCHLORIDE 50 MG/1
50 TABLET ORAL NIGHTLY PRN
Qty: 30 TABLET | Refills: 0 | Status: SHIPPED | OUTPATIENT
Start: 2020-10-31 | End: 2020-10-31

## 2020-10-31 RX ORDER — HYDROXYZINE 50 MG/1
50 TABLET, FILM COATED ORAL 3 TIMES DAILY PRN
Qty: 45 TABLET | Refills: 0 | Status: SHIPPED | OUTPATIENT
Start: 2020-10-31 | End: 2020-10-31

## 2020-10-31 RX ORDER — INSULIN LISPRO 100 [IU]/ML
INJECTION, SOLUTION INTRAVENOUS; SUBCUTANEOUS
Qty: 3 PEN | Refills: 1 | Status: SHIPPED | OUTPATIENT
Start: 2020-10-31 | End: 2020-12-22 | Stop reason: SDUPTHER

## 2020-10-31 RX ORDER — HYDROXYZINE 50 MG/1
50 TABLET, FILM COATED ORAL 3 TIMES DAILY PRN
Qty: 45 TABLET | Refills: 0 | Status: SHIPPED | OUTPATIENT
Start: 2020-10-31 | End: 2020-11-15

## 2020-10-31 RX ORDER — NICOTINE POLACRILEX 4 MG
15 LOZENGE BUCCAL SEE ADMIN INSTRUCTIONS
Qty: 45 G | Refills: 1 | Status: SHIPPED | OUTPATIENT
Start: 2020-10-31 | End: 2022-06-01

## 2020-10-31 RX ORDER — LISINOPRIL 5 MG/1
5 TABLET ORAL DAILY
Qty: 30 TABLET | Refills: 0 | Status: SHIPPED | OUTPATIENT
Start: 2020-10-31 | End: 2022-06-01

## 2020-10-31 RX ORDER — VENLAFAXINE HYDROCHLORIDE 37.5 MG/1
37.5 CAPSULE, EXTENDED RELEASE ORAL
Qty: 30 CAPSULE | Refills: 0 | Status: SHIPPED | OUTPATIENT
Start: 2020-11-01 | End: 2022-06-01

## 2020-10-31 RX ORDER — INSULIN GLARGINE 100 [IU]/ML
30 INJECTION, SOLUTION SUBCUTANEOUS 2 TIMES DAILY
Qty: 5 PEN | Refills: 3 | Status: SHIPPED | OUTPATIENT
Start: 2020-10-31 | End: 2020-12-22 | Stop reason: SDUPTHER

## 2020-10-31 RX ORDER — LISINOPRIL 10 MG/1
10 TABLET ORAL DAILY
Status: DISCONTINUED | OUTPATIENT
Start: 2020-10-31 | End: 2020-10-31 | Stop reason: HOSPADM

## 2020-10-31 RX ORDER — TRAZODONE HYDROCHLORIDE 50 MG/1
50 TABLET ORAL NIGHTLY PRN
Qty: 30 TABLET | Refills: 0 | Status: SHIPPED | OUTPATIENT
Start: 2020-10-31 | End: 2022-07-13

## 2020-10-31 RX ORDER — ATORVASTATIN CALCIUM 40 MG/1
40 TABLET, FILM COATED ORAL NIGHTLY
Qty: 30 TABLET | Refills: 1 | Status: SHIPPED | OUTPATIENT
Start: 2020-10-31 | End: 2022-06-01

## 2020-10-31 RX ADMIN — INSULIN GLARGINE 30 UNITS: 100 INJECTION, SOLUTION SUBCUTANEOUS at 08:46

## 2020-10-31 RX ADMIN — LISINOPRIL 10 MG: 10 TABLET ORAL at 09:43

## 2020-10-31 RX ADMIN — VENLAFAXINE HYDROCHLORIDE 37.5 MG: 37.5 CAPSULE, EXTENDED RELEASE ORAL at 08:45

## 2020-10-31 RX ADMIN — INFLUENZA A VIRUS A/VICTORIA/2454/2019 IVR-207 (H1N1) ANTIGEN (PROPIOLACTONE INACTIVATED), INFLUENZA A VIRUS A/HONG KONG/2671/2019 IVR-208 (H3N2) ANTIGEN (PROPIOLACTONE INACTIVATED), INFLUENZA B VIRUS B/VICTORIA/705/2018 BVR-11 ANTIGEN (PROPIOLACTONE INACTIVATED), INFLUENZA B VIRUS B/PHUKET/3073/2013 BVR-1B ANTIGEN (PROPIOLACTONE INACTIVATED) 0.5 ML: 15; 15; 15; 15 INJECTION, SUSPENSION INTRAMUSCULAR at 14:26

## 2020-10-31 ASSESSMENT — PAIN SCALES - GENERAL: PAINLEVEL_OUTOF10: 2

## 2020-10-31 ASSESSMENT — PAIN DESCRIPTION - FREQUENCY: FREQUENCY: INTERMITTENT

## 2020-10-31 ASSESSMENT — PAIN DESCRIPTION - PAIN TYPE: TYPE: ACUTE PAIN

## 2020-10-31 ASSESSMENT — PAIN DESCRIPTION - DESCRIPTORS: DESCRIPTORS: SORE

## 2020-10-31 ASSESSMENT — PAIN DESCRIPTION - LOCATION: LOCATION: ARM

## 2020-10-31 ASSESSMENT — PAIN DESCRIPTION - ORIENTATION: ORIENTATION: LEFT

## 2020-10-31 NOTE — GROUP NOTE
Group Therapy Note    Date: 10/31/2020    Group Start Time: 1340  Group End Time: 9178  Group Topic: Psychotherapy    [facility]    CRISTINA Najera        Group Therapy Note: Today's group focused on feelings identification and focusing on what we can change in our lives and letting go of what we cant. Attendees: 9       Patient's Goal:   Improve suicidal thoughts, Improve impulses to self-harm, understand triggers    Notes: Patient was present and actively engaged in group. She shard a lot with the group about her positive looking forward is that she gets to go home today and is going to propose to her partner. She talked about struggling with a little with her depression. Her goal for today was getting to go home and She wants to work on letting go of families expectations of herself. Status After Intervention:  Improved    Participation Level:  Active Listener    Participation Quality: Appropriate, Attentive, Sharing and Supportive      Speech:  normal      Thought Process/Content: Logical      Affective Functioning: Congruent      Mood: euthymic      Level of consciousness:  Alert, Oriented x4 and Attentive      Response to Learning: Able to verbalize current knowledge/experience, Able to verbalize/acknowledge new learning, Able to retain information, Capable of insight, Able to change behavior and Progressing to goal      Endings: None Reported    Modes of Intervention: Support, Exploration and Problem-solving      Discipline Responsible: /Counselor      Signature:  CRISTINA Fajardo

## 2020-10-31 NOTE — PROGRESS NOTES
Group Therapy Note    Date: 10/30/2020  Start Time: 2000  End Time:  2020    Type of Group: Wrap-Up    Patient's Goal:  \"Keep mood positive\"    Notes:  ongoing    Status After Intervention:  Unchanged    Participation Level: Minimal    Participation Quality: Appropriate      Speech:  normal      Thought Process/Content: Logical      Affective Functioning: Congruent      Mood: anxious      Level of consciousness:  Alert      Response to Learning: Progressing to goal      Endings: None Reported    Modes of Intervention: Education and Support      Discipline Responsible: Registered Nurse      Signature:  Alexis Pinedo, RN

## 2020-10-31 NOTE — PROGRESS NOTES
This RN has reviewed and agrees with VIOLETTE Cano LPN's data collection and has collaborated with this LPN regarding the patient's care plan.

## 2020-10-31 NOTE — PLAN OF CARE
Problem: Suicide risk  Goal: Provide patient with safe environment  Description: Provide patient with safe environment  10/30/2020 2018 by Hayder Cain RN  Outcome: Ongoing  Note: The patient is checked every 15 minutes during day time hours and every 30 minutes during night time hours to ensure safety. 10/30/2020 0858 by Enrique Landers RN  Outcome: Ongoing  Note: Maintained in safe and secure environment. Problem: Depressive Behavior With or Without Suicide Precautions:  Goal: Able to verbalize and/or display a decrease in depressive symptoms  Description: Able to verbalize and/or display a decrease in depressive symptoms  10/30/2020 2018 by Hayder Cain RN  Outcome: Ongoing  Note: the patient states she does not feel depressed and rates mood as a 10 out of 10.  10/30/2020 0858 by Enrique Landers RN  Outcome: Ongoing  Note: Pt affect bright and smiling. Pt laughing and very social with peers  Goal: Able to verbalize support systems  Description: Able to verbalize support systems  10/30/2020 2018 by Hayder Cain RN  Outcome: Ongoing  Note: the patient states Support system is roommates and partner. 10/30/2020 0858 by Enrique Landers RN  Outcome: Ongoing  Note: Patient reports friends  as their support system. Goal: Patient specific goal  Description: Patient specific goal  10/30/2020 2018 by Hayder Cain RN  Outcome: Ongoing  Note: the patient goal today was \"keep mood positive\". 10/30/2020 0858 by Enrique Landers RN  Outcome: Ongoing  Note: Patient reports goal for today is to stay positive   Goal: Participates in care planning  Description: Participates in care planning  10/30/2020 2018 by Hayder Cain RN  Outcome: Ongoing  Note: the patient participates in interdisciplinary care planning. 10/30/2020 0858 by Enrique Landers RN  Outcome: Ongoing  Note: Patient discussed treatment plan with physician/medical staff, attending group, and compliant with medications. its  control. 10/30/2020 2018 by Raymond Hollnad RN  Outcome: Ongoing  Note: Patient glucose was 137 for bedtime check. 10/30/2020 0858 by Les Peña RN  Outcome: Ongoing     Problem: Coping:  Goal: Ability to identify problematic behaviors that deter socialization will improve  Description: Ability to identify problematic behaviors that deter socialization will improve  Outcome: Ongoing  Note: the patient has good peer interaction on the unit with peers. Problem: Depressive Behavior With or Without Suicide Precautions:  Goal: Absence of self-harm  Description: Absence of self-harm  10/30/2020 0858 by Les Peña RN  Outcome: Completed  Note: No self harm behaviors were observed or reported so far this shift. Remains on every 15 minutes precautions for safety. Problem: Activity:  Goal: Physical symptoms of sleep deprivation will improve  Description: Physical symptoms of sleep deprivation will improve  10/30/2020 0858 by Les Peña RN  Outcome: Completed  Goal: Sleeping patterns will improve  Description: Sleeping patterns will improve  10/30/2020 0858 by Les Peña RN  Outcome: Completed     Problem: Coping:  Goal: Ability to identify problematic behaviors that deter socialization will improve  Description: Ability to identify problematic behaviors that deter socialization will improve  10/30/2020 0858 by Les Peña RN  Outcome: Completed   Care plan reviewed with patient. Patient verbalized understanding of the plan of care and contribute to goal setting.

## 2020-10-31 NOTE — PROGRESS NOTES
Behavioral Health   Discharge Note    Pt discharged with followings belongings:   Dentures: None  Vision - Corrective Lenses: Glasses  Hearing Aid: None  Jewelry: Earrings  Body Piercings Removed: Yes(eyebrow and tongue)  Clothing: Footwear, Jacket / coat, Pants, Socks, Undergarments (Comment)  Were All Patient Medications Collected?: Yes  Other Valuables: Cell phone, Money (Comment), Wallet, Other (Comment)   Valuables sent home with Patient . Valuables retrieved from safe, Security envelope number:  N/A and returned to patient. Patient left department with transport via Ambulation . Discharged to private residence . \"An Important Message from [employer] About Your Rights\" form photocopy original from admission and provided to pt at discharge N  Patient education on aftercare instructions: YES  Bridge Appointment completed: Reviewed Discharge Instructions with patient. Patient verbalizes understanding and agreement with the discharge plan using the teachback method. Information faxed to N/A by N/A Patient verbalize understanding of AVS:  YES.     Status EXAM upon discharge:  Status and Exam  Normal: Yes  Facial Expression: Brightened  Affect: Appropriate  Level of Consciousness: Alert  Mood:Normal: Yes  Motor Activity:Normal: Yes  Interview Behavior: Cooperative  Preception: [place] to Person, [place] to Time, [place] to Place, [place] to Situation  Attention:Normal: Yes  Thought Processes: Circumstantial  Thought Content:Normal: Yes  Hallucinations: None  Delusions: No  Memory:Normal: Yes  Insight and Judgment: No  Insight and Judgment: Poor Judgment  Present Suicidal Ideation: No  Present Homicidal Ideation: No  Rachelle Ruiz LPN

## 2020-10-31 NOTE — DISCHARGE INSTR - COC
Continuity of Care Form    Patient Name: Luz Maria Taylor   :  2000  MRN:  [id number]    Admit date:  10/28/2020  Discharge date:  ***    Code Status Order: Full Code   Advance Directives:   Advance Care Flowsheet Documentation     Date/Time Healthcare Directive Type of Healthcare Directive Copy in [address] Agent's Name Healthcare Agent's Phone Number    10/28/20 1802  No, patient does not have an advance directive for healthcare treatment -- -- -- -- --          Admitting Physician:  Devora Griffith MD  PCP: Tony Vega MD    Discharging Nurse: [facility] Unit/Room#: 4E-65/065-A  Discharging Unit Phone Number: ***    Emergency Contact:   Extended Emergency Contact Information  [facility]  Address: [address] Phone: [phone]  Mobile Phone: [phone]  Relation: Parent  Secondary Emergency Contact: SABINA Govea  Phone: [phone]  Mobile Phone: [phone]  Relation: Other    Past Surgical History:  History reviewed. No pertinent surgical history.     Immunization History:   Immunization History   Administered Date(s) Administered    Influenza, Quadv, IM, PF (6 mo and older Fluzone, Flulaval, Fluarix, and 3 yrs and older Afluria) 10/31/2020       Active Problems:  Patient Active Problem List   Diagnosis Code    Diabetes mellitus ([facility] E11.9    Severe episode of recurrent major depressive disorder, without psychotic features ([facility] F33.2    Anxiety state F41.1    Depression F32.9    Heart murmur R01.1    Type 1 diabetes mellitus without complication ([facility] L01.9       Isolation/Infection:   Isolation          No Isolation        Patient Infection Status     Infection Onset Added Last Indicated Last Indicated By Review Planned Expiration Resolved Resolved By    None active    Resolved    COVID-19 Rule Out 10/28/20 10/28/20 10/28/20 COVID-19 (Ordered)   10/28/20 Rule-Out Test Resulted          Nurse Assessment:  Last Vital Signs: /74   Pulse 105   Temp 97.7 °F (36.5 °C) (Tympanic)   Resp 16   Ht 4' 6.75\" (1.391 m)   Wt 199 lb (90.3 kg)   LMP 10/01/2020   SpO2 98%   BMI 46.68 kg/m²     Last documented pain score (0-10 scale): Pain Level: 2  Last Weight:   Wt Readings from Last 1 Encounters:   10/28/20 199 lb (90.3 kg)     Mental Status:  {IP PT MENTAL STATUS:20030}    IV Access:  { JOSE G IV ACCESS:394189468}    Nursing Mobility/ADLs:  Walking   {CHP DME FSRK:142577949}  Transfer  {CHP DME NMUC:526326408}  Bathing  {CHP DME PYAO:975140173}  Dressing  {CHP DME RILN:456574354}  Toileting  {CHP DME PWUX:444066829}  Feeding  {CHP DME MUCJ:406082143}  Med Admin  {CHP DME OSNQ:974642187}  Med Delivery   { JOSE G MED Delivery:176051517}    Wound Care Documentation and Therapy:        Elimination:  Continence:   · Bowel: {YES / OD:61571}  · Bladder: {YES / VF:22193}  Urinary Catheter: {Urinary Catheter:019127072}   Colostomy/Ileostomy/Ileal Conduit: {YES / ON:62108}       Date of Last BM: ***    Intake/Output Summary (Last 24 hours) at 10/31/2020 1511  Last data filed at 10/30/2020 1725  Gross per 24 hour   Intake 120 ml   Output --   Net 120 ml     I/O last 3 completed shifts:   In: 120 [P.O.:120]  Out: -     Safety Concerns:     [facility] Safety Concerns:654718632}    Impairments/Disabilities:      [facility] Impairments/Disabilities:907676586}    Nutrition Therapy:  Current Nutrition Therapy:   [facility] Diet List:522894389}    Routes of Feeding: {CHP DME Other Feedings:401402515}  Liquids: {Slp liquid thickness:04969}  Daily Fluid Restriction: {CHP DME Yes amt example:084484003}  Last Modified Barium Swallow with Video (Video Swallowing Test): {Done Not Done [facility]:643688185}    Treatments at the Time of Hospital Discharge:   Respiratory Treatments: ***  Oxygen Therapy:  {Therapy; copd oxygen:83924}  Ventilator:    { CC Vent NEZL:018698704}    Rehab Therapies: {THERAPEUTIC INTERVENTION:6928954321}  Weight Bearing Status/Restrictions: 50[facility] CC Weight Bearin}  Other Medical Equipment (for information only, NOT a DME order):  {EQUIPMENT:616532187}  Other Treatments: ***    Patient's personal belongings (please select all that are sent with patient):  {CHP DME Belongings:542907553}    RN SIGNATURE:  {Esignature:137458809}    CASE MANAGEMENT/SOCIAL WORK SECTION    Inpatient Status Date: ***    Readmission Risk Assessment Score:  Readmission Risk              Risk of Unplanned Readmission:        5           Discharging to Facility/ Agency   · Name:   · Address:  · Phone:  · Fax:    Dialysis Facility (if applicable)   · Name:  · Address:  · Dialysis Schedule:  · Phone:  · Fax:    / signature: {Esignature:147286400}    PHYSICIAN SECTION    Prognosis: {Prognosis:3488516749}    Condition at Discharge: [address] Patient Condition:251045159}    Rehab Potential (if transferring to Rehab): {Prognosis:3887201791}    Recommended Labs or Other Treatments After Discharge: ***    Physician Certification: I certify the above information and transfer of Mushtaq Yao  is necessary for the continuing treatment of the diagnosis listed and that he requires {Admit to Appropriate Level of Care:92115} for {GREATER/LESS:764511421} 30 days.      Update Admission H&P: {CHP DME Changes in BGAZI:070413457}    PHYSICIAN SIGNATURE:  {Esignature:004940626}
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
## 2020-10-31 NOTE — PROGRESS NOTES
Hospitalist Consult Progress Note    Patient:  Vinay Chaney      Unit/Bed:4E-65/065-A    YOB: 2000    MRN: [id number]       Acct: [de-identified]     PCP: Julian Stokes MD    Date of Admission: 10/28/2020    Attending Physician: Ashli Alvarez, *    Reason for Consult: DM management      Assessment/Plan:  Active Hospital Problems    Diagnosis Date Noted    Severe episode of recurrent major depressive disorder, without psychotic features [facility] [F33.2] 10/28/2020    Diabetes mellitus ([facility] [E11.9] 09/15/2018     Discharge changes (D/w Patient and Dr. Roselia Farias)  -Lantus 30u BID ordered  -Humalog 5-15u TID AC (1u:3g carb) + low dose SSI (Per Dr. Roselia Farias)  -Lipitor 40u daily - normal LFTs  -Lisinopril 5mg daily  -to f/up with Endocrine in [facility] - will be looking into alternative insulin pumps. Consider stopping testosterone, as this is causing elevated lipids most likely. Starting on lipitor. Patient willing to stop. T1DM: on insulin pump, ran out of pods. Basal bolus regimen while INP. -A1C 10.9%  -Lantus 30u BID - recommendations per Dr. Roselia Farias  -Carb controlled diet.   -Dr. Roselia Farias consulted for assistance - awaiting recommendations  -Likely will need lantus/lispro for backup and until pods can be filled  -SW for assistance with cost of pods    Tachycardia: may be related to glucose excursions, will monitor. TSH wnl. HTN: as noted below, will start on ACE for dual benefit of bp control; and albuminuria    HLD: likely some contribution from testosterone supp. Start lipitor. Microalbuminuria: albumin/creat ratio 36 - should be on ACE/ARB but need to discuss pregnancy risks - no intention of becoming pregnant - is on testosterone (identifies as male)  -D/w patient-she is agreeable to start on ACE, start lisinopril 5mg daily.    -preg test neg  -f/up as OP    MDD: management per Psychiatry - mood improved, no SI/HI currently (present on arrival)    Morbid Obesity: counseling on healthy lifestyle and weight loss. Consider for bariatric surgery when depression controlled. Thank you, Chuck Naqvi, *, for the opportunity to participate in this patient's care. Expected discharge date: Per Primary - suspected in 1 day    Disposition:  Per Primary      -------------------------------------------------------------    Chief Complaint: SI, depression    Hospital Course: Patient admitted with the above complaint to inpatient psych. She has a history of T1DM and has been out of her insulin pump pods for some time. Hospitalist team consulted to assist with DM management while her mental health was addressed by Psychiatry in [address] [place] [address], 29. Subjective (past 24 hours): Patient seen at bedside, she is feeling better as far as mood today. No longer endorsing SI or HI. No other complaints. Ready to be discharged.        Medications:  Reviewed    Infusion Medications    dextrose      dextrose       Scheduled Medications    lisinopril  10 mg Oral Daily    atorvastatin  40 mg Oral Nightly    insulin lispro  5-15 Units Subcutaneous TID WC    insulin lispro  0-6 Units Subcutaneous TID WC    insulin lispro  0-3 Units Subcutaneous Nightly    insulin glargine  30 Units Subcutaneous BID    venlafaxine  37.5 mg Oral Daily with breakfast     PRN Meds: glucose, dextrose, glucagon (rDNA), dextrose, acetaminophen, ibuprofen, hydrOXYzine, traZODone, magnesium hydroxide, aluminum & magnesium hydroxide-simethicone, dextrose      Intake/Output Summary (Last 24 hours) at 10/31/2020 1301  Last data filed at 10/30/2020 1725  Gross per 24 hour   Intake 120 ml   Output --   Net 120 ml       Diet:  DIET CARB CONTROL; Carb Control: 3 carb choices (45 gms)/meal    Exam:  /74   Pulse 105   Temp 97.7 °F (36.5 °C) (Tympanic)   Resp 16   Ht 4' 6.75\" (1.391 m)   Wt 199 lb (90.3 kg)   LMP 10/01/2020   SpO2 98%   BMI 46.68 kg/m²     General appearance: No apparent distress, well developed, appears stated age. Obese  Eyes:  Pupils equal, round, and reactive to light. Conjunctivae/corneas clear. HENT: Head normal in appearance. External nares normal.  Oral mucosa moist without lesions. Hearing grossly intact. Neck: Supple, with full range of motion. No jugular venous distention. Trachea midline. Respiratory:  Normal respiratory effort. Clear to auscultation, bilaterally without rales or wheezes or rhonchi. Cardiovascular: Normal rate, regular rhythm with normal S1/S2 without murmurs. No lower extremity edema. Abdomen: Soft, non-tender, non-distended with normal bowel sounds. Musculoskeletal: Normal range of motion in extremities. There is no joint swelling or tenderness. Skin: Skin color, texture, turgor normal.  No rashes or lesions. Neurologic:  Neurovascularly intact without any focal sensory/motor deficits in the upper and lower extremities. Cranial nerves:  grossly non-focal.  Psychiatric: Alert and oriented, thought content appropriate, normal insight. Labs:   Recent Labs     10/28/20  1630   WBC 10.4   HGB 15.1   HCT 45.2        Recent Labs     10/28/20  1629      K 3.9      CO2 23   BUN 11   CREATININE 0.8   CALCIUM 10.1     Recent Labs     10/28/20  1629   AST 33   ALT 37   BILITOT 0.3   ALKPHOS 81     No results for input(s): INR in the last 72 hours. No results for input(s): Lisa Guevara in the last 72 hours.     Microbiology:      Urinalysis:      Lab Results   Component Value Date    NITRU NEGATIVE 09/15/2018    WBCUA 5-10 09/15/2018    BACTERIA FEW 09/15/2018    RBCUA 3-5 09/15/2018    BLOODU NEGATIVE 09/15/2018    SPECGRAV 1.020 11/16/2014    GLUCOSEU >= 1000 09/15/2018       Radiology:  No orders to display         DVT prophylaxis: [] Lovenox                                  [] SCDs                                 [] SQ Heparin                                 [x] Encourage ambulation           [] Already on Anticoagulation     Code Status: Full Code    PT/OT Eval Status: na    Diet: carb    Tele:   [] yes             [x] no      Thank you, Yayo Azevedo, *, for the opportunity to participate in this patient's care.        Electronically signed by Anjali Tipton DO on 10/31/2020 at 1:01 PM

## 2020-10-31 NOTE — SUICIDE SAFETY PLAN
SAFETY PLAN    A suicide Safety Plan is a document that supports someone when they are having thoughts of suicide. Warning Signs that indicate a suicidal crisis may be developing: What (situations, thoughts, feelings, body sensations, behaviors, etc.) do you experience that lets you know you are beginning to think about suicide? 1.STAYING SECLUDED    2DISINTEREDED  3. NUNBNESS    Internal Coping Strategies:  What things can I do (relaxation techniques, hobbies, physical activities, etc.) to take my mind off my problems without contacting another person? 1. Walking  2. Talking   3. Music    People and social settings that provide distraction: Who can I call or where can I go to distract me? 1. Name: Edwena Phalen  Phone: [phone]  2. Name:Dar Phone: [phone]   6. Place:[facility]           4. Place:     People whom I can ask for help: Who can I call when I need help - for example, friends, family, clergy, someone else? 1. Name:Silvia              DMZEX:[phone]  2. Karen Phone: [phone]  0. Name:Kerri  Phone [phone]    Professionals or [employer] agencies I can contact during a crisis: Who can I call for help - for example, my doctor, my psychiatrist, my psychologist, a mental health provider, a suicide hotline? 1. Clinician Zohra Chapin. Lacho Domínguez   [phone]      Clinician Pager or Emergency Contact #:    2. Clinician Name Christal Zepeda Phone:     [phone]    3. Suicide Prevention Lifeline: 3-729-407-TALK (9619)    4. [address] [place] [address] Emergency Services -  for example, [address] [facility] suicide hotline, [facility] Hotline:       Emergency Services Address: [address] [facility]. [facility] [address]       Emergency Services Phone: [phone]    Making the environment safe: How can I make my environment (house/apartment/living space) safer? For example, can I remove guns, medications, and other items? 1. Sharps   2.  Meds to friends

## 2020-10-31 NOTE — BH NOTE
Group Therapy Note    Date: 10/31/2020  Start Time: 2065  End Time:  4729  Number of Participants: 9    Type of Group: Psychotherapy      Group's Goal:  Increase insight into self esteem: definition, where it comes from, who or what affects it, why it is important, what is self talk and how to increase self esteem. We made a list of actions, thoughts & behaviors that boost and bust self esteem (self care vs self neglect, forgiveness and survivor vs. guilt, shame, anger victim, 12 step program vs drugs and alcohol, socializing vs isolation ect). Notes:  Each client was asked to give one positive self trait:  \"I am honest\"   And rate their self esteem today on scale of #1-10 with 10 the most self esteem:  #9    Gave positive praise for coming and sharing. We listened to song called: \"Fear Is A Liar\" by Puja Betancourt to end group. Status After Intervention:  Improved    Participation Level:  Active Listener    Participation Quality: Appropriate, Attentive, Sharing and Supportive      Speech:  normal      Thought Process/Content: Logical      Affective Functioning: Congruent, Blunted, Flat and Constricted/Restricted      Mood: depressed but brightens on interaction      Level of consciousness:  Alert, Oriented x4, Attentive and Drowsy      Response to Learning: Able to verbalize current knowledge/experience, Able to verbalize/acknowledge new learning, Able to retain information, Capable of insight and Able to change behavior      Endings: None Reported    Modes of Intervention: Education, Support, Socialization and Activity      Discipline Responsible: Registered Nurse      Signature:  CARLIN Huber RN MSN

## 2020-10-31 NOTE — PROGRESS NOTES
Department of Internal Medicine  Section of Endocrinology   [facility]    [address] , [address], [address]  Office Phone Ophelia Haynes [address]  ([address])  ([address])  [address] Z[address]l Efra Amador MD, Fellow of [address] [place] [address] Endocrinology   Progress Note    Admit Date: 10/28/2020    Reviewed the chart of the last 24 hours:   SUBJECTIVE:     Chief Complaint   Patient presents with    Suicidal     MDD (major depressive disorder), recurrent episode ([address] [F33.9]   Patient Active Problem List   Diagnosis Code    Diabetes mellitus ([address] E11.9    Severe episode of recurrent major depressive disorder, without psychotic features ([address] F33.2    Anxiety state F41.1    Depression F32.9    Heart murmur R01.1    Type 1 diabetes mellitus without complication (HCC) H34.9     Severe episode of recurrent major depressive disorder, without psychotic features ([address]  10/28/2020  3:31 PM  Admission weight: 190 lb (86.2 kg)  [id number]  [id number]  [id number]  He has been referred by Dr [unfilled] for evaluation of    Patient has no complaints. Medication side effects: none  Patient is eating 100%    Review of Systems  Review of Systems - General ROS: negative   Psychological ROS: negative   Hematological and Lymphatic ROS: No history of blood clots or bleeding disorder.    Respiratory ROS: no cough, shortness of breath, or wheezing   Cardiovascular ROS: no chest pain or dyspnea on exertion   Gastrointestinal ROS: no abdominal pain, change in bowel habits, or black or bloody stools   Genito-Urinary ROS: no dysuria, trouble voiding, or hematuria   Musculoskeletal ROS: negative   Neurological ROS: no TIA or stroke symptoms   Dermatological ROS: negative    Past Medical, Surgical, Family, Social and Allergy Histories:  I have reviewed the patient's medical history in detail and updated the computerized patient record. has a past medical history of Anxiety, Depression, and Diabetes mellitus ([address] has no past surgical history on file. reports that he has never smoked. He has never used smokeless tobacco. He reports that he does not drink alcohol or use drugs. family history includes Atrial Fibrillation in his mother; Breast Cancer in his maternal aunt; Cancer in his maternal aunt; Depression in his maternal aunt; Diabetes in his maternal grandmother; Other in his father. Allergies   Allergen Reactions    Sulfa Antibiotics Swelling     Family member has severe allergic reaction so pt. Doesn't take per recommendation?      Current Facility-Administered Medications   Medication Dose Route Frequency Provider Last Rate Last Dose    lisinopril (PRINIVIL;ZESTRIL) tablet 10 mg  10 mg Oral Daily Lucendia Severin, DO   10 mg at 10/31/20 0943    insulin lispro (HUMALOG) injection vial 5-15 Units  5-15 Units Subcutaneous TID  Genesis Sherwood MD   22 Units at 10/31/20 0846    insulin lispro (HUMALOG) injection vial 0-6 Units  0-6 Units Subcutaneous TID  Genesis Sherwood MD   1 Units at 10/31/20 0853    insulin lispro (HUMALOG) injection vial 0-3 Units  0-3 Units Subcutaneous Nightly Cash Miranda MD        insulin glargine (LANTUS) injection vial 30 Units  30 Units Subcutaneous BID Idania Hughes MD   30 Units at 10/31/20 0846    glucose (GLUTOSE) 40 % oral gel 15 g  15 g Oral PRN Idania Hughes MD        dextrose 50 % IV solution  12.5 g Intravenous PRN Idania Hughes MD        glucagon (rDNA) injection 1 mg  1 mg Intramuscular PRN Idania Hughes MD        dextrose 5 % solution  100 mL/hr Intravenous PRN Idania Hughes MD        venlafaxine [facility] XR) extended release capsule 37.5 mg  37.5 mg Oral Daily with breakfast Eddie Javier PA-C   37.5 mg at 10/31/20 0845    acetaminophen (TYLENOL) tablet 650 mg  650 mg Oral Q4H PRN Shanti Cobian MD   650 mg at 10/30/20 1553    ibuprofen (ADVIL;MOTRIN) tablet 400 mg  400 mg Oral Q6H PRN Fernando Garza MD        hydrOXYzine (ATARAX) tablet 50 mg  50 mg Oral TID PRN Fernando Garza MD        traZODone (DESYREL) tablet 50 mg  50 mg Oral Nightly PRN Fernando Garza MD   50 mg at 10/30/20 2153    magnesium hydroxide (MILK OF MAGNESIA) 400 MG/5ML suspension 30 mL  30 mL Oral Daily PRN Fernando Garza MD        aluminum & magnesium hydroxide-simethicone (MAALOX) 200-200-20 MG/5ML suspension 30 mL  30 mL Oral Q6H PRN Fernando Garza MD        dextrose 5 % solution  100 mL/hr Intravenous PRN Hemant Garcia MD         Home Meds:   Prior to Admission medications    Medication Sig Start Date End Date Taking? Authorizing Provider   venlafaxine (EFFEXOR XR) 37.5 MG extended release capsule Take 1 capsule by mouth daily (with breakfast) 11/1/20  Yes Fernando Garza MD   traZODone (DESYREL) 50 MG tablet Take 1 tablet by mouth nightly as needed for Sleep 10/31/20  Yes Fernando Garza MD   hydrOXYzine (ATARAX) 50 MG tablet Take 1 tablet by mouth 3 times daily as needed for Anxiety 10/31/20 11/15/20 Yes Fernando Garza MD   testosterone cypionate (DEPOTESTOTERONE CYPIONATE) 200 MG/ML injection Inject 200 mg into the muscle every 7 days. Every Tuesday   Yes Historical Provider, MD   insulin aspart (NOVOLOG) 100 UNIT/ML injection vial Pt is IDDM on insulin pump at basal Carb to insulin ratio of 1.2/hr plus breakthroughs with meals to a total of approximately 60 units/ per day. Patient taking differently: Indications: pt out of pods Pt is IDDM on insulin pump at basal Carb to insulin ratio of 1.2/hr plus breakthroughs with meals to a total of approximately 80 units/ per day.  9/17/18  Yes Jimmy Moore MD     Scheduled Meds:   lisinopril  10 mg Oral Daily    insulin lispro  5-15 Units Subcutaneous TID WC    insulin lispro  0-6 Units Subcutaneous TID WC    insulin lispro  0-3 Units Subcutaneous Nightly    insulin glargine  30 Units Subcutaneous BID    venlafaxine  37.5 mg Oral Daily with breakfast     Continuous Infusions:   dextrose      dextrose       PRN Meds:glucose, dextrose, glucagon (rDNA), dextrose, acetaminophen, ibuprofen, hydrOXYzine, traZODone, magnesium hydroxide, aluminum & magnesium hydroxide-simethicone, dextrose    OBJECTIVE        Physical    VITALS:  /74   Pulse 106   Temp 96.7 °F (35.9 °C) (Oral)   Resp 16   Ht 4' 6.75\" (1.391 m)   Wt 199 lb (90.3 kg)   LMP 10/01/2020   SpO2 98%   BMI 46.68 kg/m²   CONSTITUTIONAL:  morbidly obese  LUNGS:  No increased work of breathing, good air exchange, clear to auscultation bilaterally, no crackles or wheezing  CARDIOVASCULAR:  Normal apical impulse, regular rate and rhythm, normal S1 and S2, no S3 or S4, and no murmur noted  ABDOMEN:  No scars, normal bowel sounds, soft, non-distended, non-tender, no masses palpated, no hepatosplenomegally    DATA  TSH:    Lab Results   Component Value Date    TSH 1.710 10/28/2020   No components found for: FREE T4No components found for: FREET3  RADIOLOGYREPORTS:    Lab Review  @[email or web address]@  [unfilled]  Lab Results   Component Value Date    TSH 1.710 10/28/2020    T4FREE 1.28 02/04/2020     No results found for: FREET4  No results found for: TESTOSTERONE  CBC:  Lab Results   Component Value Date    WBC 10.4 10/28/2020    RBC 5.14 10/28/2020    HGB 15.1 10/28/2020    HCT 45.2 10/28/2020    MCV 87.9 10/28/2020    MCH 29.4 10/28/2020    MCHC 33.4 10/28/2020    RDW 12.6 11/16/2014     10/28/2020    MPV 10.3 10/28/2020     CMP:    Lab Results   Component Value Date     10/28/2020    K 3.9 10/28/2020     10/28/2020    CO2 23 10/28/2020    BUN 11 10/28/2020    CREATININE 0.8 10/28/2020    GFRAA >60 11/16/2014    LABGLOM 90 10/28/2020    GLUCOSE 163 10/28/2020    PROT 7.9 10/28/2020    LABALBU 4.3 10/28/2020    CALCIUM 10.1 10/28/2020    BILITOT 0.3 10/28/2020 ALKPHOS 81 10/28/2020    AST 33 10/28/2020    ALT 37 10/28/2020     Hepatic Function Panel:    Lab Results   Component Value Date    ALKPHOS 81 10/28/2020    ALT 37 10/28/2020    AST 33 10/28/2020    PROT 7.9 10/28/2020    BILITOT 0.3 10/28/2020    BILIDIR <0.2 11/16/2014    IBILI 0.0 11/16/2014    LABALBU 4.3 10/28/2020     Magnesium:    Lab Results   Component Value Date    MG 1.8 09/17/2018     PT/INR:  No results found for: PROTIME, INR  HgBA1c:    Lab Results   Component Value Date    LABA1C 10.9 10/28/2020      No results for input(s): CKTOTAL, CKMB, CKMBINDEX, TROPONINI in the last 72 hours. FLP:    Lab Results   Component Value Date    TRIG 243 10/31/2020    HDL 48 10/31/2020    [address] 228 10/31/2020     DIET: DIET CARB CONTROL; Carb Control: 3 carb choices (45 gms)/meal  Impression:    Principal Problem:    Severe episode of recurrent major depressive disorder, without psychotic features   Active Problems:    Diabetes mellitus Type 1 Uncontrolled  Hyperlipidemia  She is taking Testosterone 200 mg weekly. It is causing hyperlipidemia, Obesity and is raising the blood sugar, I recommend it should be stopped. ASSESSMENT AND PLAN    Low fat diet  Atorvastatin 40 mg HS daily  Referral has been made to Diabetes Clinic for follow up  She is taking weekly 200 mg Testosterone IMI injection (not prescribed by me)  Testosterone excess in a phenotypic female can cause excess body hair, specifically facial hair. balding. acne. enlarged clitoris. decreased breast size. deepening of the voice. increased muscle mass.

## 2020-11-01 LAB — C-PEPTIDE: < 0.1 NG/ML (ref 1.1–4.4)

## 2020-11-04 LAB — TESTOSTERONE TOTAL: 197 NG/DL

## 2020-11-05 ENCOUNTER — OFFICE VISIT (OUTPATIENT)
Dept: INTERNAL MEDICINE CLINIC | Age: 20
End: 2020-11-05
Payer: MEDICARE

## 2020-11-05 VITALS
TEMPERATURE: 97.8 F | DIASTOLIC BLOOD PRESSURE: 68 MMHG | HEIGHT: 59 IN | WEIGHT: 203.6 LBS | BODY MASS INDEX: 41.04 KG/M2 | SYSTOLIC BLOOD PRESSURE: 104 MMHG | HEART RATE: 120 BPM

## 2020-11-05 PROCEDURE — G0108 DIAB MANAGE TRN  PER INDIV: HCPCS | Performed by: INTERNAL MEDICINE

## 2020-11-05 RX ORDER — IBUPROFEN 600 MG/1
1 TABLET ORAL PRN
COMMUNITY
Start: 2020-09-30 | End: 2022-06-01 | Stop reason: SDUPTHER

## 2020-11-05 NOTE — PATIENT INSTRUCTIONS
Keep a foodlog of everything you are eating, how much you ate, the             carbohydrate count and the dose of Humalog you took        Also write your blood sugars before meals and bedtime on your foodlog  Stay active --keep walking every day. Think about using Lantus 28 units morning and night to avoid the blood sugars in                  The 50's and lower  Low blood sugar less than 70 = 15 grams fast glucose                              Less than 55 = 30 grams fast glucose                WAIT 10-15 minutes--let the glucose soak in. Then follow with meal or                        Snack (after blood sugar comes back up)  For insulin injections -focus more on stomach and thighs.  The arms will absorb                slower

## 2020-11-05 NOTE — PROGRESS NOTES
Diabetes Mellitus Type I, Initial:  Dr. Leatha Campbell  Patient here for evaluation of Type 1 diabetes mellitus. The initial diagnosis of diabetes was made 2004. His clinical course has been stable. Insulin dosage review with Grisel Ibarra suggested noncompliance some of the time. Associated symptoms of hyperglycemia have been nauease, headache, vomiting, weakness, thrist.  Associated symptoms of hypoglycemia have been shaky, dizzy, hunger, weakness, blurred vision. No stage 3 lows, history of stage 2 low BS several years ago (BS was under 20)    He is currently taking Humalog 1:3 carb ratio plus group 1 scale/  Rich is taking own injections. Abdomen and thighs  Compliance with blood glucose monitoring: fair. The patient does perform independently. Rotation of sites for injection: abdominal wall and thigh(s)  Exercise: walking every day 30-60 minutes  Meal panning: He is using carb counting. B- 10a-1pm               D- 6-8pm               occas snacks -rice cakes-give Humalog               Drinking water and diet tea  Blood glucose times and ranges: FBS's . Dinner   MedicAlert Identification Noted? no     Focus:  Diabetes education initial visit. Recent A1C 10.9%. Lottie Mcguire has had 3 significant lows since discharge from the hospital. Discussed reducing Lantus dose by 2 units BID for safety. Rich reports that he has been on an insulin pump for years, but ran out of pods for his OmniPod 2 weeks ago. He is now using injections and waiting for paperwork to be processed for the Medtronic insulin pump. He reports that he feels he is doing better with injections, but is anxious to start the Medtronic pump with CGM. Lottie Mcguire is very positive today. He reports that he feels his depression is in control. It seems that he is making statements that he thinks are desired.  He admits to not having good control of diabetes for some time, but states he is well educated on carb counting, insulin admin, pumps, etc. Rich does not feel he needs much education, just the mental health to do what is needed. He will meet with RD for meal planning; with focus on cholesterol/ fat choices/ limits. We will assist Rich with new Medtronic insulin pump when she gets this therapy. Much encouragment given. Follow up 1 week    Plan:  Reviewed physiology of Diabetes, carbs; fats; exercise; BG goals; treating low BS; medtronic pump./ CGM. Keep a foodlog of everything you are eating, how much you ate, the             carbohydrate count and the dose of Humalog you took        Also write your blood sugars before meals and bedtime on your foodlog  Stay active --keep walking every day. Think about using Lantus 28 units morning and night to avoid the blood sugars in                  The 50's and lower  Low blood sugar less than 70 = 15 grams fast glucose                              Less than 55 = 30 grams fast glucose                WAIT 10-15 minutes--let the glucose soak in. Then follow with meal or                        Snack (after blood sugar comes back up)  For insulin injections -focus more on stomach and thighs. The arms will absorb                Slower  Rich voiced understanding of above instructions via teach back and willingness to participate in the above plan of care. Time spent in direct contact with patient 60 minutes.

## 2020-12-22 ENCOUNTER — OFFICE VISIT (OUTPATIENT)
Dept: INTERNAL MEDICINE CLINIC | Age: 20
End: 2020-12-22
Payer: MEDICARE

## 2020-12-22 VITALS
HEIGHT: 59 IN | DIASTOLIC BLOOD PRESSURE: 80 MMHG | SYSTOLIC BLOOD PRESSURE: 135 MMHG | BODY MASS INDEX: 41.08 KG/M2 | WEIGHT: 203.8 LBS | TEMPERATURE: 97.9 F | HEART RATE: 101 BPM

## 2020-12-22 PROCEDURE — G8482 FLU IMMUNIZE ORDER/ADMIN: HCPCS | Performed by: NURSE PRACTITIONER

## 2020-12-22 PROCEDURE — 2022F DILAT RTA XM EVC RTNOPTHY: CPT | Performed by: NURSE PRACTITIONER

## 2020-12-22 PROCEDURE — G8417 CALC BMI ABV UP PARAM F/U: HCPCS | Performed by: NURSE PRACTITIONER

## 2020-12-22 PROCEDURE — 99204 OFFICE O/P NEW MOD 45 MIN: CPT | Performed by: NURSE PRACTITIONER

## 2020-12-22 PROCEDURE — 1036F TOBACCO NON-USER: CPT | Performed by: NURSE PRACTITIONER

## 2020-12-22 PROCEDURE — G8427 DOCREV CUR MEDS BY ELIG CLIN: HCPCS | Performed by: NURSE PRACTITIONER

## 2020-12-22 PROCEDURE — 3046F HEMOGLOBIN A1C LEVEL >9.0%: CPT | Performed by: NURSE PRACTITIONER

## 2020-12-22 RX ORDER — LISINOPRIL 5 MG/1
5 TABLET ORAL DAILY
Qty: 30 TABLET | Refills: 0 | Status: CANCELLED | OUTPATIENT
Start: 2020-12-22

## 2020-12-22 RX ORDER — ATORVASTATIN CALCIUM 40 MG/1
40 TABLET, FILM COATED ORAL NIGHTLY
Qty: 30 TABLET | Refills: 1 | Status: CANCELLED | OUTPATIENT
Start: 2020-12-22

## 2020-12-22 RX ORDER — NICOTINE POLACRILEX 4 MG
15 LOZENGE BUCCAL SEE ADMIN INSTRUCTIONS
Qty: 45 G | Refills: 1 | Status: CANCELLED | OUTPATIENT
Start: 2020-12-22

## 2020-12-22 RX ORDER — VENLAFAXINE HYDROCHLORIDE 37.5 MG/1
37.5 CAPSULE, EXTENDED RELEASE ORAL
Qty: 30 CAPSULE | Refills: 0 | Status: CANCELLED | OUTPATIENT
Start: 2020-12-22

## 2020-12-22 RX ORDER — INSULIN LISPRO 100 [IU]/ML
INJECTION, SOLUTION INTRAVENOUS; SUBCUTANEOUS
Qty: 3 PEN | Refills: 1 | Status: SHIPPED | OUTPATIENT
Start: 2020-12-22 | End: 2021-02-12

## 2020-12-22 RX ORDER — TRAZODONE HYDROCHLORIDE 50 MG/1
50 TABLET ORAL NIGHTLY PRN
Qty: 30 TABLET | Refills: 0 | Status: CANCELLED | OUTPATIENT
Start: 2020-12-22

## 2020-12-22 RX ORDER — IBUPROFEN 600 MG/1
1 TABLET ORAL PRN
Status: CANCELLED | OUTPATIENT
Start: 2020-12-22

## 2020-12-22 RX ORDER — INSULIN GLARGINE 100 [IU]/ML
INJECTION, SOLUTION SUBCUTANEOUS
Qty: 5 PEN | Refills: 3 | Status: SHIPPED | OUTPATIENT
Start: 2020-12-22 | End: 2021-03-05 | Stop reason: ALTCHOICE

## 2020-12-22 NOTE — PROGRESS NOTES
[facility] PROFESSIONAL SERVS  PHYSICIANS [facility]. [facility] [address], [address]  Dept: [phone]  Dept Fax: [phone]      Oliver Aragon  23 y.o.  [id number]  12/22/20    Chief Complaint   Patient presents with    Diabetes       Current concerns - Oliver Aragon presents for new diabetic visit. Pt has been following with Dr. Romana Kato. T1DM - Since age 8. A1C 10.9% 10/28/2020. Lowest pt has seen in 8.0%. Has been working with Dr. Romana Kato for 3 years. On omnipod pump previously. Reports has made dietary changes, exercising more. Pt was in a study in [place] in which pt was given Metformin around age 10 and states weight lost approx 30#. Tried it again but couldn't tolerate it. Not drinking soda, watching carb portions, increased veggies. Currently taking Lantus 28 units BID, Humalog 1 unit per 3 carbs, plus scale    Pt is very frustrated, feels everything they try leads to weight gain. Has gained 20#. Feels nauseated in the morning, after eating meals will feel ok if eating veggies, however if eating meals out feels tired/sluggish. States was tested for gastroparesis - was negative, 6 years ago. On Lisinopril 5 mg daily. MACR 36 on 10/29/2020. Acne and hirstuism - Normal menses, regular every month. Not on OCP. Used to be very heavy menses, but lightening up. Per chart, identifies male. PCOS labs reviewed and negative for significant findings. HLD - On Lipitor 40 mg daily. Results for Ed Melgar" (MRN [id number]) as of 12/29/2020 14:36   Ref. Range 10/31/2020 07:54   Cholesterol, Total Latest Ref Range: 100 - 199 mg/dL 325 (H)   HDL Cholesterol Latest Units: mg/dL 48   LDL Calculated Latest Units: mg/dL 228   Triglycerides Latest Ref Range: 0 - 199 mg/dL 243 (H)   C-Peptide Latest Ref Range: 1.1 - 4.4 ng/mL < 0.1 (L)       Pt had an admission for mental health - started Effexor and Trazodone - following up with [facility] Past Medical History:   Diagnosis Date    Anxiety     Depression     Diabetes mellitus ([facility] [address]         No past surgical history on file. Family History   Problem Relation Age of Onset    Atrial Fibrillation Mother     Other Father     Cancer Maternal Aunt     Breast Cancer Maternal Aunt     Depression Maternal Aunt     Diabetes Maternal Grandmother         Social History     Socioeconomic History    Marital status: Single     Spouse name: Not on file    Number of children: 0    Years of education: 4    Highest education level: Not on file   Occupational History    Not on file   Social Needs    Financial resource strain: Not on file    Food insecurity     Worry: Not on file     Inability: Not on file   [employer] needs     Medical: Not on file     Non-medical: Not on file   Tobacco Use    Smoking status: Never Smoker    Smokeless tobacco: Never Used   Substance and Sexual Activity    Alcohol use: No    Drug use: No    Sexual activity: Yes     Partners: Female   Lifestyle    Physical activity     Days per week: Not on file     Minutes per session: Not on file    Stress: Not on file   Relationships    Social connections     Talks on phone: Not on file     Gets together: Not on file     Attends [employer] service: Not on file     Active member of club or organization: Not on file     Attends meetings of clubs or organizations: Not on file     Relationship status: Not on file    Intimate partner violence     Fear of current or ex partner: Not on file     Emotionally abused: Not on file     Physically abused: Not on file     Forced sexual activity: Not on file   Other Topics Concern    Not on file   Social History Narrative    Not on file       Prior to Admission medications    Medication Sig Start Date End Date Taking?  Authorizing Provider   insulin glargine (LANTUS SOLOSTAR) 100 UNIT/ML injection pen 30 units am, 28 units pm. 12/22/20  Yes MEMO Mills CNP   Insulin Pen Needle 31G X 5 MM MISC 1 each by Does not apply route daily TID with insulin injection 12/22/20  Yes MEMO Vera CNP   insulin lispro, 1 Unit Dial, (HUMALOG KWIKPEN) 100 UNIT/ML SOPN 5-15 units TID with meals based on carb intake - use 1 unit for every 3g carb. Also add on sliding scale based on your glucose, If 140-199 2U, 200-249 3U, 250-299 4U, 300-349 5U, 350-399 6U, Over 399 7U    -  Max daily dose 66 units 12/22/20  Yes MEMO Vera CNP   GLUCAGON EMERGENCY 1 MG injection Inject 1 each as directed as needed 9/30/20  Yes Historical Provider, MD   atorvastatin (LIPITOR) 40 MG tablet Take 1 tablet by mouth nightly 10/31/20  Yes Mainor Caprice, DO   lisinopril (PRINIVIL;ZESTRIL) 5 MG tablet Take 1 tablet by mouth daily 10/31/20  Yes Mainor Manceraricfarhad, DO   traZODone (DESYREL) 50 MG tablet Take 1 tablet by mouth nightly as needed for Sleep 10/31/20  Yes Mainor Jensen, DO   venlafaxine (EFFEXOR XR) 37.5 MG extended release capsule Take 1 capsule by mouth daily (with breakfast) 11/1/20  Yes Mainor Jensen DO   glucose (GLUTOSE) 40 % GEL Take 37.5 mLs by mouth See Admin Instructions 10/31/20  Yes Mainor Jensen, DO        Allergies   Allergen Reactions    Sulfa Antibiotics Swelling     Family member has severe allergic reaction so pt. Doesn't take per recommendation? Review of Systems - General ROS: negative for - chills, fatigue or fever. Reports weight gain. Respiratory ROS: no cough, shortness of breath, or wheezing  Cardiovascular ROS: no chest pain or dyspnea on exertion  Gastrointestinal ROS: no abdominal pain, change in bowel habits, or black or bloody stools. Positive nausea  Genito-Urinary ROS: no dysuria, trouble voiding, or hematuria  Musculoskeletal ROS: negative for - muscle pain or muscular weakness  Neurological ROS: negative for - dizziness, headaches or numbness/tingling  Dermatological ROS: negative for - skin lesion changes or wounds. Positive for-acne, hirsutism.     Blood pressure 135/80, pulse 101, temperature 97.9 °F (36.6 °C), temperature source Temporal, height 4' 11\" (1.499 m), weight 203 lb 12.8 oz (92.4 kg). Physical Examination:   Constitutional - Alert, cooperative, well groomed, and in no distress. Vital signs stable   Vitals:    12/22/20 0924   BP: 135/80   Pulse: 101   Temp: 97.9 °F (36.6 °C)     Mental status - Alert and oriented to person, place, and time. Good insight, appropriate responses, mood appropriate. Head - Atraumatic, normocephalic. Eyes - Pupils equal and round  Ears - External ears are normal, hearing is grossly normal to normal speech  Mouth - Oropharynx clear, mucous membranes pink and moist, dentition intact. Neck - supple, no significant adenopathy, no JVD. Chest - No distress. No increased work of breathing. Clear to auscultation in all fields, no wheezes, rales or rhonchi, symmetric air entry.    Heart - normal rate, regular rhythm, normal S1, S2, no murmurs, rubs  or gallops  Abdomen -soft, nontender, nondistended, central truncal obesity  Neurological - alert, oriented, grossly intact  Extremities - peripheral pulses normal, no pedal edema, no clubbing or cyanosis  Skin - warm and dry, no rashes, lesions, or wounds evident on exposed skin    Diagnostic Data:  I have reviewed recent diagnostic testing including labs    Lab Results   Component Value Date     10/28/2020    K 3.9 10/28/2020     10/28/2020    CO2 23 10/28/2020    BUN 11 10/28/2020    CREATININE 0.8 10/28/2020    GLUCOSE 163 10/28/2020    CALCIUM 10.1 10/28/2020      Lab Results   Component Value Date    LABA1C 10.9 (H) 10/28/2020     No results found for: EAG  Lab Results   Component Value Date    CHOL 325 (H) 10/31/2020     Lab Results   Component Value Date    TRIG 243 (H) 10/31/2020     Lab Results   Component Value Date    HDL 48 10/31/2020     Lab Results   Component Value Date    LDLCALC 228 10/31/2020     No results found for: LABVLDL, VLDL  No results found for: [facility]  Lab Results   Component Value Date    WBC 10.4 10/28/2020    HGB 15.1 10/28/2020    HCT 45.2 10/28/2020    MCV 87.9 10/28/2020     10/28/2020         Assessment/Plan:   Diagnosis Orders   1. Type 1 diabetes mellitus with hyperglycemia (HCC)  insulin glargine (LANTUS SOLOSTAR) 100 UNIT/ML injection pen    Insulin Pen Needle 31G X 5 MM MISC    insulin lispro, 1 Unit Dial, (HUMALOG KWIKPEN) 100 UNIT/ML SOPN       No orders of the defined types were placed in this encounter. Glucose logs reviewed and discussed with patient. Instructions as follows:  Lantus 30 units am, 28 units pm.   Humalog 5-15 units TID with meals based on carb intake - use 1 unit for every 3g carb. Also add on sliding scale based on your glucose,   If 140-199 2U   200-249 3U  250-299 4U   300-349 5U  350-399 6U   Over 399 7U    Glucose logs in two weeks. If any lows - call. Call you insurance and find out who is on your plan for endocrinology for more in-depth work-up. Also advised to see GYN for further evaluation for PCOS. Will schedule follow up appointment in 6 weeks. Needs set up with Kristina SUAZO for new pump. Electronically signed by MEMO Collazo CNP 12/22/20 10:31 AM     [address]  [address], [address]     Phone number: [phone]  Fax number: [phone]

## 2020-12-22 NOTE — PATIENT INSTRUCTIONS
Lantus 30 units am, 28 units pm.   Humalog 5-15 units TID with meals based on carb intake - use 1 unit for every 3g carb. Also add on sliding scale based on your glucose,   If 140-199 2U   200-249 3U  250-299 4U   300-349 5U  350-399 6U   Over 399 7U    Glucose logs in two weeks. If any lows - call. Call you insurance and find out who is on your plan for endocrinology.

## 2021-01-05 ENCOUNTER — TELEPHONE (OUTPATIENT)
Dept: INTERNAL MEDICINE CLINIC | Age: 21
End: 2021-01-05

## 2021-01-05 NOTE — TELEPHONE ENCOUNTER
I left patient a message to see if she has received her pump yet. If so, I asked if she would be interested in coming in on 1/6/21 to be instructed. I asked for a return call.

## 2021-02-12 ENCOUNTER — OFFICE VISIT (OUTPATIENT)
Dept: INTERNAL MEDICINE CLINIC | Age: 21
End: 2021-02-12

## 2021-02-12 VITALS — TEMPERATURE: 97.1 F

## 2021-02-12 DIAGNOSIS — E10.65 TYPE 1 DIABETES MELLITUS WITH HYPERGLYCEMIA (HCC): ICD-10-CM

## 2021-02-12 DIAGNOSIS — E10.9 TYPE 1 DIABETES MELLITUS WITHOUT COMPLICATION (HCC): Primary | ICD-10-CM

## 2021-02-12 RX ORDER — INSULIN LISPRO 100 [IU]/ML
INJECTION, SOLUTION INTRAVENOUS; SUBCUTANEOUS
Qty: 9 ML | Refills: 3 | Status: SHIPPED | OUTPATIENT
Start: 2021-02-12 | End: 2021-03-05

## 2021-02-12 NOTE — PATIENT INSTRUCTIONS
Calibrations are needed at 2 hours, 6 hours and every 12 hours            --you will always have to calibrate at least every 12 hours            Recommend to calibrate 3-4 times per day               --before meals and bedtime when blood sugars are the most stable  Set up a [employer] account                [email or web address]                     Signin                     joiz                    Set up an account.    Establish a username and password  Download your insulin pump Arya night/ Monday morning               --send a [employer] message that your downloaded and what your                  Username and password is  Any questions, call the clinic  [employer] is available 24/7 for needed assistance

## 2021-02-17 ENCOUNTER — TELEPHONE (OUTPATIENT)
Dept: INTERNAL MEDICINE CLINIC | Age: 21
End: 2021-02-17

## 2021-02-17 NOTE — TELEPHONE ENCOUNTER
Medtronic 670G insulin pump without automode downloaded via [employer]. Call to [place] to confirm/ discuss results on download. Left message to the call the clinic back.

## 2021-03-05 ENCOUNTER — TELEPHONE (OUTPATIENT)
Dept: INTERNAL MEDICINE CLINIC | Age: 21
End: 2021-03-05

## 2021-03-05 DIAGNOSIS — E10.65 TYPE 1 DIABETES MELLITUS WITH HYPERGLYCEMIA (HCC): ICD-10-CM

## 2021-03-05 RX ORDER — INSULIN LISPRO 100 [IU]/ML
INJECTION, SOLUTION INTRAVENOUS; SUBCUTANEOUS
Qty: 9 ML | Refills: 3
Start: 2021-03-05 | End: 2021-06-08 | Stop reason: ALTCHOICE

## 2021-03-05 NOTE — TELEPHONE ENCOUNTER
Diabetes education. Medtronic insulin pump downloaded. Glucose levels are elevated throughout the day; elevating further in the evening. Basal rate 1.2 units per hr  Basal/ Bolus currently 30-40%/ 60-70%    NELDA Ledesma CNP,      Please authorize the Diabetes Clinic at [address] [place] [address] to teach/ assist Wil Garcia to increase her basal rate at 12am to 1.3 units and 6pm basal rate to 1.35 units. If you agree, please note and return. Thank you.

## 2021-03-05 NOTE — TELEPHONE ENCOUNTER
L4 Mobile [facility] on [facility] setting changes:  Basal rates 12am to 1.3 units and 6pm basal rate to 1.35 units.

## 2021-06-08 ENCOUNTER — VIRTUAL VISIT (OUTPATIENT)
Dept: INTERNAL MEDICINE CLINIC | Age: 21
End: 2021-06-08

## 2021-06-08 DIAGNOSIS — E10.9 TYPE 1 DIABETES MELLITUS WITHOUT COMPLICATION (HCC): Primary | ICD-10-CM

## 2021-06-08 NOTE — PATIENT INSTRUCTIONS
Basal rates 12am 1.2 units, 11am 1.25 units, 6pm 1.35  Units  Carb ratio 12am 2.5 grams  Consider Automode--blue shield is on the screen when in operation       Turn on: under [employer]. *this turns off suspend on low        *you will most likely have to do a blood sugar before it will start automode       -if autmode not starting--look at automode readiness screen-under status                        Screen  Count carbs. Recommend about 3 servings of carbohydrate per meal  Great job walking most days!   Download your insulin pump again in 1-2 weeks and let Jesus Alberto Foster know

## 2021-06-08 NOTE — PROGRESS NOTES
[facility]  [address], [place], [address]  [phone] (phone)  [phone] (fax)    Patient ID: Emelyn Barrientos [id number]  Referring Provider: Dr. Myranda Staton is a 23 y.o. receiving Diabetes education via Virtual Visit (video visit) encounter. Virtual Visit was conducted with patient's (and/or legal guardian's) consent, to reduce the patient's risk of exposure to COVID-19 and provide necessary medical care. Patient identification was verified at the start of the visit: yes     Total time spent on this encounter: 61     Services were provided through a video synchronous discussion virtually to substitute for in-person clinic visit. Patient was located at home and provider was located at Internal Medicine Office. Patient's name and  were verified. Subjective:    He presents for His follow-up diabetic visit. He has type 1 diabetes mellitus. Home regimen includes: Insulin He is noncompliant some of the time. Assessment:     Lab Results   Component Value Date    LABA1C 10.9 10/28/2020    BUN 11 10/28/2020    CREATININE 0.8 10/28/2020     There were no vitals filed for this visit. Wt Readings from Last 3 Encounters:   20 203 lb 12.8 oz (92.4 kg)   20 203 lb 9.6 oz (92.4 kg)   20 185 lb (83.9 kg)     Ht Readings from Last 3 Encounters:   20 4' 11\" (1.499 m)   20 4' 11\" (1.499 m)   20 4' 11\" (1.499 m)       Current monitoring regimen: home blood tests - 4+ times daily/ Medtronic CGM  Home blood sugar trends: see insulin pump download  Any episodes of hypoglycemia? yes - 2-3 times per day; highest risk during morning hours/   Previous visit with dietician: remote  Current diet: B skips                      L kinchi/ potato/ carrots                       D roast/ potato/ carrots                      Rare snacks-popcorn. Water. reare diet pop.   Current exercise: active at [id number] Walking 30 minutes every day; early afternoon  Eye exam current (within one year): 7/2020 [facility]  Any history of foot problems? no  Last foot exam: remote  Immunizations up to date: yes - 2020  Taking ASA:  No - If not why? Not indicated  Appropriate for use of MyChart Glucose Grid:  Yes    Focus:     Diabetes education. Virtual visit. Michael Daniel is doing fairly well with insulin pump therapy. She is ready to start automode. Automdoe instructions given as well as instructions to view the manual provided by [employer]. She agrees to download pump again in 1 week for review. Discussed basal rates below for consideration with current settings, but will initiated automode today. Much encouragement given. Follow up 3 months. DSME PLAN:   Discussed general issues about diabetes pathophysiology and management. Counseling at today's visit: automode; BG goals; basal vs bolus; treating low Bs. Basal rates 12am 1.2 units, 11am 1.25 units, 6pm 1.35  Units  Carb ratio 12am 2.5 grams  Consider Automode--blue shield is on the screen when in operation       Turn on: under [employer]. *this turns off suspend on low        *you will most likely have to do a blood sugar before it will start automode       -if autmode not starting--look at automode readiness screen-under status                        Screen  Count carbs. Recommend about 3 servings of carbohydrate per meal  Great job walking most days! Download your insulin pump again in 1-2 weeks and let Roebrt Corbett know  [employer], medications, PMH and nursing assessment reviewed. Gemma Yeboah states He is willing to participate in this plan of care and verbalized understanding of all instructions provided. Teach back used to verify comprehension. Total time involved in direct patient education: 60 minutes.

## 2021-06-10 NOTE — TELEPHONE ENCOUNTER
Pt requests insulin script for novolog for Insulin pump. Former [place] pt that has an appt with Dr. Micah Henderson in August.   She had a virtual with adia burk yesterday but a script was not sent in. Therefore script was called in to torrie in [facility].

## 2021-06-16 ENCOUNTER — TELEPHONE (OUTPATIENT)
Dept: INTERNAL MEDICINE CLINIC | Age: 21
End: 2021-06-16

## 2021-06-16 NOTE — TELEPHONE ENCOUNTER
[facility] Left message. Was automode started? Request to download insulin pump and let the clinic know that is has been downloaded.

## 2021-06-16 NOTE — TELEPHONE ENCOUNTER
Patient called in and stated that she downloaded her pump. It is attached to this encounter. I also put in on your desk. Rich stated that she is not in automode. Please advise if needed.  Thank you

## 2021-06-17 ENCOUNTER — TELEPHONE (OUTPATIENT)
Dept: INTERNAL MEDICINE CLINIC | Age: 21
End: 2021-06-17

## 2021-06-17 NOTE — TELEPHONE ENCOUNTER
Medtronic 670G pump with CGM downloaded. Pump is being suspended via autosuspend through the night at least 3 nights per week. Noting elevated BS's are not coming down with corrections. More elevations during the day. Currently basal/ bolus 40%/60%    Dr. Dennis Calderón,     Please authorize the Diabetes Clinic at [address] [place] [address] to teach/ assist Rich to make the following adjustments in her pump settings:  Basal rates        12am 1.2 units  --decr 1.15 units       11am 1.25 units change to 8am/ incr 1.3                          Units       6pm 1.35 units  --dec 1.25 units  Carb ratio 12am 2.5 grams  Sensitivity 12am 40mg  --inc 26 mg    If you agree, please note and return. Thank you.

## 2021-06-22 NOTE — TELEPHONE ENCOUNTER
Patient message sent to [place] to make pump  Setting changes as directed. Basal rates        12am 1.2 units  --decr 1.15 units       11am 1.25 units change to 8am/ incr 1.3                          Units       6pm 1.35 units  --dec 1.25 units  Carb ratio 12am 2.5 grams  Sensitivity 12am 40mg  --inc 26 mg  Request confirmation message back from [facility] from [place] confirming changes above and understanding of changes.

## 2021-07-01 ENCOUNTER — TELEPHONE (OUTPATIENT)
Dept: INTERNAL MEDICINE CLINIC | Age: 21
End: 2021-07-01

## 2021-07-01 NOTE — TELEPHONE ENCOUNTER
Diabetes education. Medtronic 670G insulin pump downloaded and scanned to Epic. Struggling with meal clearance and elevated BS's later in the day. Dr. Jazmin Trevino,  Please authorize the Diabetes Clinic at [address] [place] [address] to teach/ assist Rich to make the following insulin pump setting changes:  Basal rate 5pm -1.25  Units --incr 1.3 units  Carb ratio 11am 2.5 grams  --incr 2.2 grams     If you agree, please note and return. Thank you.

## 2021-07-02 NOTE — TELEPHONE ENCOUNTER
Ok to titrate the insulin, meds for better blood sugars    Electronically signed by Neema Christopher MD on 7/2/2021 at 9:11 AM

## 2021-07-07 NOTE — TELEPHONE ENCOUNTER
Call to [facility]. Instructed on insulin pump setting changes as directed. Basal rate 5pm -1.25  Units --incr 1.3 units  Carb ratio 11am 2.5 grams  --incr 2.2 grams   Rich voiced understanding via teach back.

## 2022-05-17 ENCOUNTER — TELEPHONE (OUTPATIENT)
Dept: INTERNAL MEDICINE CLINIC | Age: 22
End: 2022-05-17

## 2022-05-17 NOTE — TELEPHONE ENCOUNTER
Tried contacting the pt. Because they have not been seen in office in a very long time. I left a voicemail to the pt. To call me back so we can schedule an appt. And that he needs to be seen in office for future refills. I will try to call the pt. Again.

## 2022-05-31 NOTE — TELEPHONE ENCOUNTER
Tried calling the pt. Twice today. No answer once again. I will still be on the look out for this pt. But there is nothing else that can be done unless they call back. The calls were originally for contacting them to schedule an appt. And too refill a medication.

## 2022-06-01 ENCOUNTER — OFFICE VISIT (OUTPATIENT)
Dept: INTERNAL MEDICINE CLINIC | Age: 22
End: 2022-06-01
Payer: MEDICARE

## 2022-06-01 VITALS
BODY MASS INDEX: 40.76 KG/M2 | HEART RATE: 118 BPM | WEIGHT: 202.2 LBS | SYSTOLIC BLOOD PRESSURE: 144 MMHG | DIASTOLIC BLOOD PRESSURE: 90 MMHG | TEMPERATURE: 98.1 F | HEIGHT: 59 IN

## 2022-06-01 DIAGNOSIS — E10.9 TYPE 1 DIABETES MELLITUS WITHOUT COMPLICATION (HCC): Primary | ICD-10-CM

## 2022-06-01 DIAGNOSIS — E78.5 HYPERLIPIDEMIA, UNSPECIFIED HYPERLIPIDEMIA TYPE: ICD-10-CM

## 2022-06-01 LAB — HBA1C MFR BLD: 10.1 % (ref 4.3–5.7)

## 2022-06-01 PROCEDURE — 3046F HEMOGLOBIN A1C LEVEL >9.0%: CPT | Performed by: STUDENT IN AN ORGANIZED HEALTH CARE EDUCATION/TRAINING PROGRAM

## 2022-06-01 PROCEDURE — 83036 HEMOGLOBIN GLYCOSYLATED A1C: CPT | Performed by: STUDENT IN AN ORGANIZED HEALTH CARE EDUCATION/TRAINING PROGRAM

## 2022-06-01 PROCEDURE — 99214 OFFICE O/P EST MOD 30 MIN: CPT | Performed by: STUDENT IN AN ORGANIZED HEALTH CARE EDUCATION/TRAINING PROGRAM

## 2022-06-01 RX ORDER — INSULIN GLARGINE 100 [IU]/ML
35 INJECTION, SOLUTION SUBCUTANEOUS NIGHTLY
Qty: 5 PEN | Refills: 0 | Status: SHIPPED | OUTPATIENT
Start: 2022-06-01 | End: 2022-07-08 | Stop reason: SDUPTHER

## 2022-06-01 RX ORDER — INSULIN ASPART 100 [IU]/ML
INJECTION, SOLUTION INTRAVENOUS; SUBCUTANEOUS
Qty: 5 PEN | Refills: 3 | Status: SHIPPED | OUTPATIENT
Start: 2022-06-01 | End: 2022-10-11 | Stop reason: SDUPTHER

## 2022-06-01 RX ORDER — CITALOPRAM 10 MG/1
TABLET ORAL
COMMUNITY
Start: 2022-05-16

## 2022-06-01 RX ORDER — ACETAMINOPHEN AND CODEINE PHOSPHATE 120; 12 MG/5ML; MG/5ML
1 SOLUTION ORAL DAILY
COMMUNITY
Start: 2022-05-31

## 2022-06-01 RX ORDER — IBUPROFEN 600 MG/1
1 TABLET ORAL PRN
Qty: 1 KIT | Refills: 1 | Status: SHIPPED | OUTPATIENT
Start: 2022-06-01

## 2022-06-01 RX ORDER — ATOMOXETINE 40 MG/1
CAPSULE ORAL
COMMUNITY
Start: 2022-05-16

## 2022-06-01 SDOH — ECONOMIC STABILITY: FOOD INSECURITY: WITHIN THE PAST 12 MONTHS, THE FOOD YOU BOUGHT JUST DIDN'T LAST AND YOU DIDN'T HAVE MONEY TO GET MORE.: NEVER TRUE

## 2022-06-01 SDOH — ECONOMIC STABILITY: FOOD INSECURITY: WITHIN THE PAST 12 MONTHS, YOU WORRIED THAT YOUR FOOD WOULD RUN OUT BEFORE YOU GOT MONEY TO BUY MORE.: NEVER TRUE

## 2022-06-01 ASSESSMENT — ENCOUNTER SYMPTOMS
RESPIRATORY NEGATIVE: 1
GASTROINTESTINAL NEGATIVE: 1
ALLERGIC/IMMUNOLOGIC NEGATIVE: 1
EYES NEGATIVE: 1

## 2022-06-01 ASSESSMENT — PATIENT HEALTH QUESTIONNAIRE - PHQ9
1. LITTLE INTEREST OR PLEASURE IN DOING THINGS: 0
SUM OF ALL RESPONSES TO PHQ QUESTIONS 1-9: 4
6. FEELING BAD ABOUT YOURSELF - OR THAT YOU ARE A FAILURE OR HAVE LET YOURSELF OR YOUR FAMILY DOWN: 2
4. FEELING TIRED OR HAVING LITTLE ENERGY: 0
8. MOVING OR SPEAKING SO SLOWLY THAT OTHER PEOPLE COULD HAVE NOTICED. OR THE OPPOSITE, BEING SO FIGETY OR RESTLESS THAT YOU HAVE BEEN MOVING AROUND A LOT MORE THAN USUAL: 0
10. IF YOU CHECKED OFF ANY PROBLEMS, HOW DIFFICULT HAVE THESE PROBLEMS MADE IT FOR YOU TO DO YOUR WORK, TAKE CARE OF THINGS AT HOME, OR GET ALONG WITH OTHER PEOPLE: 0
2. FEELING DOWN, DEPRESSED OR HOPELESS: 0
3. TROUBLE FALLING OR STAYING ASLEEP: 0
SUM OF ALL RESPONSES TO PHQ9 QUESTIONS 1 & 2: 0
5. POOR APPETITE OR OVEREATING: 0
SUM OF ALL RESPONSES TO PHQ QUESTIONS 1-9: 4
7. TROUBLE CONCENTRATING ON THINGS, SUCH AS READING THE NEWSPAPER OR WATCHING TELEVISION: 2
9. THOUGHTS THAT YOU WOULD BE BETTER OFF DEAD, OR OF HURTING YOURSELF: 0

## 2022-06-01 ASSESSMENT — SOCIAL DETERMINANTS OF HEALTH (SDOH): HOW HARD IS IT FOR YOU TO PAY FOR THE VERY BASICS LIKE FOOD, HOUSING, MEDICAL CARE, AND HEATING?: NOT VERY HARD

## 2022-06-01 NOTE — PATIENT INSTRUCTIONS
-Continue with eye exam yearly  -Blood work when available   -Start Lantus 35 units daily  -Continue Novolog with SSI as noted below  -Return in 1 month  -Check Blood Glucose twice daily    Continue Sliding Scale Insulin    GROUP 2    Blood Sugar Dose fast acting insulin    None   140-199 2 unit   200-249 4 units   250-299 6 units   300-349 8 units   350-399 10 units   400 and above 12 units            Diabetes Blood Sugar Emergencies: Your Action Plan  How can you prevent a blood sugar emergency? An important part of living with diabetes is keeping your blood sugar in your target range. You'll need to know what to do if it's too high or too low. Managing your blood sugar levels helps you avoid emergencies. This care sheet will teach you about the signs of high and low blood sugar. It will help youmake an action plan with your doctor for when these signs occur. Low blood sugar is more likely to happen if you take certain medicines for diabetes. It canalso happen if you skip a meal, drink alcohol, or exercise more than usual.  You may get high blood sugar if you eat differently than you normally do. One example is eating more carbohydrate than usual. Having a cold, the flu, or other sudden illness can also cause high blood sugar levels. Levels can also rise if you miss a dose ofmedicine. Any change in how you take your medicine may affect your blood sugar level. Soit's important to work with your doctor before you make any changes. Track your blood sugar  Work with your doctor to fill in the blank spaces below that apply to you. Track your levels, know your target range, and write down ways you can get your blood sugar back in your target range. A log book can help you track yourlevels. Take the book to all of your medical appointments.  Check your blood sugar _____ times a day, at these times:________________________________________________.   (For example: Before meals, at bedtime, before exercise, during exercise, other.)   Your blood sugar target range before a meal is ___________________. Your blood sugar target range after a meal is _______________________.  Do this--___________________________________________________--to get your blood sugar back within your safe range if your blood sugar results are _________________________________________. (For example: Less than 70 or above 250 mg/dL.)  Call your doctor when your blood sugar results are ___________________________________. (For example: Less than 70 or above 250 mg/dL.)  What are the symptoms of low and high blood sugar? Common symptoms of low blood sugar are sweating and feeling shaky, weak, hungry, or confused. Symptoms can startquickly. Common symptoms of high blood sugar are feeling very thirsty or very hungry. You may also pass urine more oftenthan usual. You may have blurry vision and may lose weight without trying. But some people may have high or low blood sugar without having any symptoms. That's a good reason to check your blood sugar on a regular schedule. What should you do if you have symptoms? Work with your doctor to fill in the blank spaces below that apply to you. Low blood sugar and \"the rule of 15\"  If you have symptoms of low blood sugar, check your blood sugar. If it's below _____ ( for example, below 70), eat or drink a quick-sugar food that has about 15 grams of carbohydrate. Your goal is to get your level back to your safe range. Check your blood sugar again 15 minutes later. If it's still not in your target range, take another 15 grams of carbohydrate and check your blood sugar again in 15 minutes. Repeatthis until you reach your target. Then go back to your regular testing schedule. Children usually need less than 15 grams of carbohydrate. Check with yourdoctor or diabetes educator for the amount that is right for your child.   When you have low blood sugar, it's best to stop or reduce any physical activity until your blood sugar is back in your target range and is stable. If you must stay active, eat or drink 30 grams of carbohydrate. Then check your blood sugar again in 15 minutes. If it's not in your target range, take another 30 grams of carbohydrates. Check your blood sugar again in 15 minutes. Keep doing this until you reach your target. You can then go back to your regulartesting schedule. If your symptoms or blood sugar levels are getting worse or have not improved after 15 minutes, seek medical care right away. If you take insulin, always carry a glucagon emergency kit. Be sure your family, friends, and coworkers know how to give glucagon. Here are some examples of quick-sugar foods with 15 grams of carbohydrate:   3 or 4 glucose tablets   1 tablespoon (3 teaspoons) table sugar   ½ cup to ¾ cup (4 to 6 ounces) of fruit juice or regular (not diet) soda   Hard candy (such as 6 Life Savers)  High blood sugar  If you have symptoms of high blood sugar, check your blood sugar. Your goal is to get your level back to your target range. If it's above ______ ( for example, above 250), follow these steps:   If you missed a dose of your diabetes medicine, take it now. Take only the amount of medicine that you have been prescribed. Do not take more or less medicine.  Give yourself insulin if your doctor has prescribed it for high blood sugar.  Test for ketones, if the doctor told you to do so. If the results of the ketone test show a moderate-to-large amount of ketones, call the doctor for advice.  Wait 30 minutes after you take the extra insulin or the missed medicine. Check your blood sugar again. If your symptoms or blood sugar levels are getting worse or have not improved after taking these steps, seek medical care right away. Follow-up care is a key part of your treatment and safety. Be sure to make and go to all appointments, and call your doctor if you are having problems.  It's also a good idea to know your test results and keep alist of the medicines you take. Where can you learn more? Go to [email or web address] and sign in to your [facility] [facility] account. Enter G304 in the [facility] box to learn more about \"Diabetes Blood Sugar Emergencies: Your Action Plan. \"     If you do not have an account, please click on the \"Sign Up Now\" link. Current as of: July 28, 2021               Content Version: 13.2  © 2006-2022 Healthwise, [employer]. Care instructions adapted under license by [facility] ([facility]). If you have questions about a medical condition or this instruction, always ask your healthcare professional. [place] [address] any warranty or liability for your use of this information. Learning About Carbohydrate (Carb) Counting and Eating Out When You Have Diabetes  Why plan your meals? Meal planning can be a key part of managing diabetes. Planning meals and snacks with the right balance of carbohydrate, protein, and fat can help you keep yourblood sugar at the target level you set with your doctor. You don't have to eat special foods. You can eat what your family eats, including sweets once in a while. But you do have to pay attention to how oftenyou eat and how much you eat of certain foods. You may want to work with a dietitian or a diabetes educator. They can give you tips and meal ideas and can answer your questions about meal planning. This health professional can also help you reach a healthy weight if that is one ofyour goals. What should you know about eating carbs? Managing the amount of carbohydrate (carbs) you eat is an important part ofhealthy meals when you have diabetes. Carbohydrate is found in many foods.  Learn which foods have carbs. And learn the amounts of carbs in different foods. ? Bread, cereal, pasta, and rice have about 15 grams of carbs in a serving.  A serving is 1 slice of bread (1 ounce), ½ cup of cooked cereal, or 1/3 cup of cooked pasta or rice. ? Fruits have 15 grams of carbs in a serving. A serving is 1 small fresh fruit, such as an apple or orange; ½ of a banana; ½ cup of cooked or canned fruit; ½ cup of fruit juice; 1 cup of melon or raspberries; or 2 tablespoons of dried fruit. ? Milk and no-sugar-added yogurt have 15 grams of carbs in a serving. A serving is 1 cup of milk or 3/4 cup (6 oz) of no-sugar-added yogurt. ? Starchy vegetables have 15 grams of carbs in a serving. A serving is ½ cup of mashed potatoes or sweet potato; 1 cup winter squash; ½ of a small baked potato; ½ cup of cooked beans; or ½ cup cooked corn or green peas.  Learn how much carbs to eat each day and at each meal. A dietitian or certified diabetes educator can teach you how to keep track of the amount of carbs you eat. This is called carbohydrate counting.  If you are not sure how to count carbohydrate grams, use the plate method to plan meals. It is a quick way to make sure that you have a balanced meal. It also can help you manage the amount of carbohydrate you eat at meals. ? Divide your plate by types of foods. Put non-starchy vegetables on half the plate, meat or other protein food on one-quarter of the plate, and a grain or starchy vegetable in the final quarter of the plate. To this you can add a small piece of fruit and 1 cup of milk or yogurt, depending on how many carbs you are supposed to eat at a meal.   Try to eat about the same amount of carbs at each meal. Do not \"save up\" your daily allowance of carbs to eat at one meal.   Proteins have very little or no carbs. Examples of proteins are beef, chicken, turkey, fish, eggs, tofu, cheese, cottage cheese, and peanut butter. How can you eat out and still eat healthy?  Learn to estimate the serving sizes of foods that have carbohydrate. If you measure food at home, it will be easier to estimate the amount in a serving of restaurant food.    If the meal you order has too much carbohydrate (such as potatoes, corn, or baked beans), ask to have a low-carbohydrate food instead. Ask for a salad or non-starchy vegetables like broccoli, cauliflower, green beans, or peppers.  If you eat more carbohydrate at a meal than you had planned, take a walk or do other exercise. This will help lower your blood sugar. What are some tips for eating healthy?  Limit saturated fat, such as the fat from meat and dairy products. This is a healthy choice because people who have diabetes are at higher risk of heart disease. So choose lean cuts of meat and nonfat or low-fat dairy products. Use olive or canola oil instead of butter or shortening when cooking.  Don't skip meals. Your blood sugar may drop too low if you skip meals and take insulin or certain medicines for diabetes.  Check with your doctor before you drink alcohol. Alcohol can cause your blood sugar to drop too low. Alcohol can also cause a bad reaction if you take certain diabetes medicines. Follow-up care is a key part of your treatment and safety. Be sure to make and go to all appointments, and call your doctor if you are having problems. It's also a good idea to know your test results and keep alist of the medicines you take. Where can you learn more? Go to [email or web address] [email or web address] org and sign in to your [employer] account. Enter X601 in the [facility] box to learn more about \"Learning About Carbohydrate (Carb) Counting and Eating Out When You Have Diabetes. \"     If you do not have an account, please click on the \"Sign Up Now\" link. Current as of: September 8, 2021               Content Version: 13.2  © 1527-2643 Healthwise, Incorporated. Care instructions adapted under license by [facility] ([facility]). If you have questions about a medical condition or this instruction, always ask your healthcare professional. [place] [address] any warranty or liability for your use of this information.

## 2022-06-01 NOTE — PROGRESS NOTES
Elsa Bernard (:  2000) is a 19 y.o. adult,Established patient, here for evaluation of the following chief complaint(s):  Diabetes         ASSESSMENT/PLAN:  1. Type 1 diabetes mellitus without complication (HCC)  -     POCT glycosylated hemoglobin (Hb A1C)  -     Microalbumin / Creatinine Urine Ratio; Future  -     Comprehensive Metabolic Panel; Future  -     CBC; Future  -     TSH With Reflex Ft4; Future  -     insulin glargine (LANTUS SOLOSTAR) 100 UNIT/ML injection pen; Inject 35 Units into the skin nightly, Disp-5 pen, R-0Normal  -     [facility]  -      DIABETES FOOT EXAM  -     Glucagon, rDNA, (GLUCAGON EMERGENCY) 1 MG KIT; Inject 1 each as directed as needed (hypoglycemia), Disp-1 kit, R-1, DAWNormal  -     insulin aspart (NOVOLOG FLEXPEN) 100 UNIT/ML injection pen; GROUP 2    Blood Sugar Dose fast acting insulin   None  140-199 2 unit  200-249 4 units  250-299 6 units  300-349 8 units  350-399 10 units  400 and above 12 units, Disp-5 pen, R-3Normal  -     Insulin Pen Needle 30G X 8 MM MISC; 4 TIMES DAILY Starting Wed 2022, Disp-100 each, R-3, Normal  2. Hyperlipidemia, unspecified hyperlipidemia type  -     Lipid, Fasting; Future      Return in about 4 weeks (around 2022). T1IDDM: HbA1c 10.1 On Novolog taking 80 units daily. Not on long acting insulin. Would like to get back on pump. CRP undetectable  -discussed hypoglycemic and DKA emergencies and action plan  -Previous settings on pump Basal rates 12am 1.2 units, 11am 1.25 units, 6pm 1.35  Units with Carb ratio 2.5 grams  -hypoglycemia action plan provided  -Start 35 units Lantus daily (pen needles provided)  -Check microalbumin/Cr ratio, TSH, CMP, BMP, FLP  -Start SSI Novolog    Hyperlidemia: previous elevated TG and total cholesterol while on testosterone. Previously on statin.  Stopped after stopping testosterone  -Recheck FLP  -reconsider checking    Male in transition: Born female previously on testosterone in 2020. Stopped due to hyperlipidemia. Class 3 Obesity: Recent weight loss. 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 Previous psychiatric admission. On citalopram and trazadone PRN. Follows at [address]:  HPI   [place] patient is a 18-year-old female who presents for evaluation of her type 1 diabetes. Patient formally followed with Lori Greene and has had previous follow-up with the diabetic counselor here. Patient is a lifetime non-smoker with a past medical history significant for T1IDDM diagnosed at age 8years old, patient was a born a female but is now in transition, HLD, and depression. Last saw Lori Greene 12/22/20 and Usama Riding on 6/8/21. Was previously using pump, but has been out of supplies since December of 2021. Reports she has only checked blood sugars a handful of time in the past few months. HbA1c today is 10.1. Reports she is taking short acting insulin only and reports taking 80 units daily. She typically gives herself insulin when she \"feels\" she needs it and reports feelings of nausea accompanying episodes of hyperglycemia. Patient understands importance of checking blood sugars and agrees to check more frequently. Of note patient is still in transition with preferred pronouns of he/him/his/they/them theirs and has had previous psychiatric admission 10/22  Was previously on testosterone but this was stopped in 2020 due to elevated triglycerides. Current student at [employer] and is in [address] [place] [address] 9 months of the year. Denies smoking and reports using alcohol only sparingly      Review of Systems   Constitutional: Negative. Negative for chills, diaphoresis, fatigue and fever. HENT: Negative. Eyes: Negative. Respiratory: Negative. Cardiovascular: Negative.     Gastrointestinal: Negative. Endocrine: Negative. Genitourinary: Negative. Musculoskeletal: Negative. Skin: Negative. Allergic/Immunologic: Negative. Neurological: Negative. Hematological: Negative. Psychiatric/Behavioral: Positive for sleep disturbance. Negative for agitation, self-injury and suicidal ideas. The patient is nervous/anxious. All other systems reviewed and are negative. Objective    BP (!) 144/90 (Site: Right Upper Arm)   Pulse (!) 118   Temp 98.1 °F (36.7 °C)   Ht 4' 11\" (1.499 m)   Wt 202 lb 3.2 oz (91.7 kg)   BMI 40.84 kg/m²   Physical Exam  Vitals reviewed. Constitutional:       Appearance: He is obese. HENT:      Nose: No congestion. Mouth/Throat:      Mouth: Mucous membranes are moist.      Pharynx: Oropharynx is clear. No oropharyngeal exudate or posterior oropharyngeal erythema. Eyes:      Extraocular Movements: Extraocular movements intact. Conjunctiva/sclera: Conjunctivae normal.      Pupils: Pupils are equal, round, and reactive to light. Cardiovascular:      Rate and Rhythm: Tachycardia present. Pulses: Normal pulses. Heart sounds: Normal heart sounds. No murmur heard. No gallop. Pulmonary:      Effort: Pulmonary effort is normal.      Breath sounds: Normal breath sounds. No wheezing or rales. Abdominal:      General: Abdomen is flat. Bowel sounds are normal.      Palpations: Abdomen is soft. Tenderness: There is no abdominal tenderness. There is no guarding. Musculoskeletal:         General: Normal range of motion. Skin:     General: Skin is warm and dry. Capillary Refill: Capillary refill takes less than 2 seconds. Neurological:      General: No focal deficit present. Mental Status: He is alert and oriented to person, place, and time. Mental status is at baseline. Motor: No weakness.       Gait: Gait normal.       Visual inspection:  Deformity/amputation: present - none  Skin lesions/pre-ulcerative calluses: non appreciated  Edema: right- 1+, left- 1+    Sensory exam:  Monofilament sensation: normal  (minimum of 5 random plantar locations tested, avoiding callused areas - > 1 area with absence of sensation is + for neuropathy)    Plus at least one of the following:  Pulses: normal,   Pinprick: Intact  Proprioception: Intact  Vibration (128 Hz): Intact        On this date 6/1/2022 I have spent 90 minutes reviewing previous notes, test results and face to face with the patient discussing the diagnosis and importance of compliance with the treatment plan as well as documenting on the day of the visit. An electronic signature was used to authenticate this note. --Paul Blancas, DO PGY-2  [facility]  [facility]  [address]  Dept: [phone]  Dept Fax: [phone] : [phone]

## 2022-06-02 ENCOUNTER — TELEPHONE (OUTPATIENT)
Dept: INTERNAL MEDICINE CLINIC | Age: 22
End: 2022-06-02

## 2022-06-02 NOTE — PROGRESS NOTES
Patient presents with pump supply paperwork for [employer]. Assisted with completion & faxing of paperwork needed to obtain pump supplies. Rich currently has Medtronic 670G pump at home. Will need infusion sets/tubing, cartridges, insulin vials, and Guardian CGM.      For Pharmacy Admin Tracking Only     Time Spent (min): [id number] Fanny Andino, PharmD, [facility]  Internal Medicine Clinical Pharmacist  [phone]

## 2022-06-02 NOTE — TELEPHONE ENCOUNTER
[facility] called for a script clarification on novolog flex pen. They are asking how many times a day this is to be used.     I called [facility] back and left them a message that  this needs to be three times a day with meals

## 2022-07-08 DIAGNOSIS — E10.9 TYPE 1 DIABETES MELLITUS WITHOUT COMPLICATION (HCC): ICD-10-CM

## 2022-07-08 LAB
CHOLESTEROL, TOTAL: 294 MG/DL
CHOLESTEROL/HDL RATIO: ABNORMAL
CREATININE, URINE: 124
HDLC SERPL-MCNC: 65 MG/DL (ref 35–70)
LDL CHOLESTEROL CALCULATED: 203 MG/DL (ref 0–160)
MICROALBUMIN/CREAT 24H UR: 11.2 MG/G{CREAT}
MICROALBUMIN/CREAT UR-RTO: 90.3
NONHDLC SERPL-MCNC: ABNORMAL MG/DL
TRIGL SERPL-MCNC: 128 MG/DL
VLDLC SERPL CALC-MCNC: ABNORMAL MG/DL

## 2022-07-09 RX ORDER — INSULIN GLARGINE 100 [IU]/ML
35 INJECTION, SOLUTION SUBCUTANEOUS NIGHTLY
Qty: 5 PEN | Refills: 0 | Status: SHIPPED | OUTPATIENT
Start: 2022-07-09 | End: 2022-07-14 | Stop reason: SDUPTHER

## 2022-07-13 ENCOUNTER — OFFICE VISIT (OUTPATIENT)
Dept: INTERNAL MEDICINE CLINIC | Age: 22
End: 2022-07-13
Payer: MEDICARE

## 2022-07-13 VITALS
SYSTOLIC BLOOD PRESSURE: 116 MMHG | DIASTOLIC BLOOD PRESSURE: 60 MMHG | HEART RATE: 102 BPM | TEMPERATURE: 98 F | BODY MASS INDEX: 42.17 KG/M2 | HEIGHT: 59 IN | WEIGHT: 209.2 LBS

## 2022-07-13 VITALS
TEMPERATURE: 98 F | BODY MASS INDEX: 42.17 KG/M2 | HEIGHT: 59 IN | DIASTOLIC BLOOD PRESSURE: 60 MMHG | WEIGHT: 209.2 LBS | SYSTOLIC BLOOD PRESSURE: 116 MMHG | HEART RATE: 102 BPM

## 2022-07-13 DIAGNOSIS — E78.5 HYPERLIPIDEMIA, UNSPECIFIED HYPERLIPIDEMIA TYPE: ICD-10-CM

## 2022-07-13 DIAGNOSIS — E10.65 TYPE 1 DIABETES MELLITUS WITH HYPERGLYCEMIA (HCC): Primary | ICD-10-CM

## 2022-07-13 DIAGNOSIS — R80.9 MICROALBUMINURIA: ICD-10-CM

## 2022-07-13 DIAGNOSIS — E10.9 TYPE 1 DIABETES MELLITUS WITHOUT COMPLICATION (HCC): ICD-10-CM

## 2022-07-13 PROCEDURE — G0108 DIAB MANAGE TRN  PER INDIV: HCPCS | Performed by: INTERNAL MEDICINE

## 2022-07-13 PROCEDURE — 1036F TOBACCO NON-USER: CPT | Performed by: STUDENT IN AN ORGANIZED HEALTH CARE EDUCATION/TRAINING PROGRAM

## 2022-07-13 PROCEDURE — 2022F DILAT RTA XM EVC RTNOPTHY: CPT | Performed by: STUDENT IN AN ORGANIZED HEALTH CARE EDUCATION/TRAINING PROGRAM

## 2022-07-13 PROCEDURE — 3046F HEMOGLOBIN A1C LEVEL >9.0%: CPT | Performed by: STUDENT IN AN ORGANIZED HEALTH CARE EDUCATION/TRAINING PROGRAM

## 2022-07-13 PROCEDURE — G8427 DOCREV CUR MEDS BY ELIG CLIN: HCPCS | Performed by: STUDENT IN AN ORGANIZED HEALTH CARE EDUCATION/TRAINING PROGRAM

## 2022-07-13 PROCEDURE — 99214 OFFICE O/P EST MOD 30 MIN: CPT | Performed by: STUDENT IN AN ORGANIZED HEALTH CARE EDUCATION/TRAINING PROGRAM

## 2022-07-13 PROCEDURE — G8417 CALC BMI ABV UP PARAM F/U: HCPCS | Performed by: STUDENT IN AN ORGANIZED HEALTH CARE EDUCATION/TRAINING PROGRAM

## 2022-07-13 RX ORDER — GLUCOSAMINE HCL/CHONDROITIN SU 500-400 MG
1 CAPSULE ORAL
Qty: 100 STRIP | Refills: 3 | Status: SHIPPED | OUTPATIENT
Start: 2022-07-13 | End: 2022-10-21

## 2022-07-13 RX ORDER — TRAZODONE HYDROCHLORIDE 100 MG/1
100 TABLET ORAL NIGHTLY
COMMUNITY
Start: 2022-06-09

## 2022-07-13 RX ORDER — ATORVASTATIN CALCIUM 40 MG/1
40 TABLET, FILM COATED ORAL DAILY
Qty: 30 TABLET | Refills: 3 | Status: SHIPPED | OUTPATIENT
Start: 2022-07-13

## 2022-07-13 RX ORDER — LISINOPRIL 2.5 MG/1
2.5 TABLET ORAL DAILY
Qty: 30 TABLET | Refills: 3 | Status: SHIPPED | OUTPATIENT
Start: 2022-07-13

## 2022-07-13 ASSESSMENT — ENCOUNTER SYMPTOMS
EYES NEGATIVE: 1
ALLERGIC/IMMUNOLOGIC NEGATIVE: 1
GASTROINTESTINAL NEGATIVE: 1
RESPIRATORY NEGATIVE: 1

## 2022-07-13 NOTE — PATIENT INSTRUCTIONS
Check blood sugars before each meal and before going to bed      Goal for         Put a note on the fridge handle to check blood sugar before eating          Put a not bathroom mirror to check blood sugar at bedtime  Get rid of all of your  test strips! Keep walking every day! Good job! The week before seeing the dietician--write down every you are eating                How much you ate and how many carbs you counted.   We will check on the status of your insulin pump supplies

## 2022-07-13 NOTE — PROGRESS NOTES
Ronni Burton (:  2000) is a 19 y.o. adult,Established patient, here for evaluation of the following chief complaint(s):  Diabetes and Hyperlipidemia         ASSESSMENT/PLAN:  1. Type 1 diabetes mellitus with hyperglycemia (HCC)  -     blood glucose monitor strips; 1 strip by Other route 4 times daily (after meals and at bedtime) Test 4 times a day & as needed for symptoms of irregular blood glucose. Dispense sufficient amount for indicated testing frequency plus additional to accommodate PRN testing needs. , Other, 4 TIMES DAILY AFTER MEALS AND BEFORE BEDTIME Starting 2022, Until Fri 10/21/2022, For 100 days, Disp-100 strip, R-3, Normal  2. Hyperlipidemia, unspecified hyperlipidemia type  -     atorvastatin (LIPITOR) 40 MG tablet; Take 1 tablet by mouth daily, Disp-30 tablet, R-3Normal  3. Microalbuminuria  -     lisinopril (PRINIVIL;ZESTRIL) 2.5 MG tablet; Take 1 tablet by mouth daily, Disp-30 tablet, R-3Normal      Return in about 1 month (around 2022). T1IDDM: HbA1c 10.1 Previously not on long acting insulin. Would like to get back on pump. C peptide undetectable, has 670G mini-Pod for insulin pump  -Would like guardian CGM to interface with insulin pump  -needs reservoir and insertion set prior to restarting  -microalbumin/Cr ratio 90.3 on 22  TSH WNL, CMP WNL  -discussed hypoglycemic and DKA emergencies and action plan. Has glucagon pen  -Previous settings on pump Basal rates 12am 1.2 units, 11am 1.25 units, 6pm 1.35  Units with Carb ratio 2.5 grams  -hypoglycemia action plan provided  -Continue 35 units Lantus daily   -Continue group 2 SSI Novolog  -Start lisinopril 2.5mg for renal protection  -Schedule for dilated eye exam as able    Hyperlidemia: previous elevated TG and total cholesterol while on testosterone. , Total cholesterol 294 on FLP 22 Previously on statin.  Stopped after stopping testosterone  -restart Lipitor 40 PO daily  -no longer on jamison    Male in transition: Born female previously on testosterone in 2020. Stopped due to hyperlipidemia. Class 3 Obesity: Recent weight loss. Encouragement provided  -Follows with diabetic meal planner  -meal planning provided on previous visit    ADHD: On Straterra     Psychiatric History: Reported hx of T1BP disorder. Previous psychiatric admissions. On citalopram and trazadone PRN. Follows at [address]:   Karley Okeefe patient is a 24-year-old female who presents for evaluation of her type 1 diabetes. Patient formally followed with Candi Landis and has had previous follow-up with the diabetic counselor here. Patient is a lifetime non-smoker with a past medical history significant for T1IDDM diagnosed at age 8years old, patient was a born a female but is now in transition, HLD, and depression. Last saw Candi Landis 12/22/20 and Shanta Ruano on 6/8/21. Was previously using pump, but at time of initial evaluation had been out of supplies since December of 2021 had only checked BG a handful of time in the past few months taking up to80 units daily. He had previously given himself insulin when he \"feels\" he needed it and reports feelings of nausea accompanying episodes of hyperglycemia. Patient understands importance of checking blood sugars and agrees to check more frequently. Of note patient is still in transition with preferred pronouns of he/him/his/they/them theirs and has had previous psychiatric admission 10/22  Was previously on testosterone but this was stopped in 2020 due to elevated triglycerides. Current student at [employer] and is in [employer] [facility] 9 months of the year. Denies smoking and reports using alcohol only sparingly    7/13/22  Patient has had numerous highs and lows often occurring on the same day  and the same time on repeat days. Reports he has had similar problems in the past. Reports compliance with diabetic diet.  Staff are normal.      Palpations: Abdomen is soft. Tenderness: There is no abdominal tenderness. There is no guarding. Musculoskeletal:         General: Normal range of motion. Skin:     General: Skin is warm and dry. Capillary Refill: Capillary refill takes less than 2 seconds. Neurological:      General: No focal deficit present. Mental Status: He is alert and oriented to person, place, and time. Mental status is at baseline. Motor: No weakness. Gait: Gait normal.       On this date 7/13/2022 I have spent 90 minutes reviewing previous notes, test results and face to face with the patient discussing the diagnosis and importance of compliance with the treatment plan as well as documenting on the day of the visit. An electronic signature was used to authenticate this note. --Darryl Sellers,  PGY-2  [facility] PROFESSIONAL SERV[facility]  [facility] INTERNAL MEDIC[facility]  [address]  Dept: [phone]  Dept Fax: [phone] : [phone]

## 2022-07-13 NOTE — PATIENT INSTRUCTIONS
Follow-up with office if not heard back regarding CGM and pump          Learning About Continuous Glucose Monitors (CGMs)  What is a continuous glucose monitor? A continuous glucose monitor (CGM) is a device that you attach to your body. It reads your blood sugar (glucose) level, day and night. If your blood sugar is too low, and you don't notice it, some CGMs can alert you in time to get treatment. Your doctor may also recommend using a CGM to help you stay in yourtarget blood sugar range more consistently. How is a CGM used? A CGM has several parts. You wear one part--the sensor--against your skin. It has a tiny needle that stays under your skin and constantly reads your blood sugar level. It sends this information to a wireless . The  can tell you if your blood sugar goes up or down and how fast the blood sugar level changes. And you canview the stored data to help you identify trends in your blood sugar level. Some insulin pumps include a CGM. In this case, the insulin pump is also thereceiver. The readings from the CGM can help you decide what to eat and how to exercise. And they can help you know how much medicine to take. You can note on the  when you do these things. That way you can see how those activities affect your blood sugar throughout the day and night. All this detailed information gives you and your doctor a better idea of what your treatmentneeds are. CGM technology is always changing and getting better. Here are some things toknow about most CGMs. These may not apply to all systems.  Sensors need to be changed. Depending on the system, you may need to change the sensor every few days. Some systems have sensors that last 10 days.  Some CGMs will require that you prick your finger to confirm the CGM's accuracy.  Acetaminophen (Tylenol) can affect the results in some CGMs, reporting the readings higher than they actually are. Where can you learn more?   Go to [email or web address] and sign in to your [employer] account. Enter [phone] in the [facility] box to learn more about \"Learning About Continuous Glucose Monitors (CGMs). \"     If you do not have an account, please click on the \"Sign Up Now\" link. Current as of: July 28, 2021               Content Version: 13.3  © 2006-2022 Healthwise, Incorporated. Care instructions adapted under license by [facility] ([facility]). If you have questions about a medical condition or this instruction, always ask your healthcare professional. [address] any warranty or liability for your use of this information.

## 2022-07-13 NOTE — PROGRESS NOTES
[facility]  [address] [address] [address], [facility] [address]  [phone] (phone)  [phone] (fax)    Patient ID: Elsa Bernard 2000  Referring Provider: Dr. Echo Suazo     Patient's name and  were verified. Subjective:    He presents for His follow-up diabetic visit. He has type 1 diabetes mellitus. Home regimen includes: Insulin He is noncompliant some of the time. Assessment:     Lab Results   Component Value Date/Time    LABA1C 10.1 2022 12:46 PM    LABA1C 10.9 10/28/2020 04:30 PM    BUN 11 10/28/2020 04:29 PM    CREATININE 0.8 10/28/2020 04:29 PM     Vitals:    22 1851   BP: 116/60   Site: Left Upper Arm   Position: Sitting   Pulse: (!) 102   Temp: 98 °F (36.7 °C)   Weight: 209 lb 3.2 oz (94.9 kg)   Height: 4' 11\" (1.499 m)     Wt Readings from Last 3 Encounters:   22 209 lb 3.2 oz (94.9 kg)   22 209 lb 3.2 oz (94.9 kg)   22 202 lb 3.2 oz (91.7 kg)     Ht Readings from Last 3 Encounters:   22 4' 11.02\" (1.499 m)   22 4' 11\" (1.499 m)   22 4' 11\" (1.499 m)       Diabetes Pharmacotherapy:  Lantus  Novolog 1 unit per 3 grams  670G  Medtronic pump on hold until cleared with insurance/ supply compnay    Glucose Trends:   Current monitoring regimen: Fingerstick blood tests - 2-3 times daily  Home blood sugar trends:    -Fasting AM:    -Before lunch: 172-416   -Before dinner:    -Bedtime: , 333  Any episodes of hypoglycemia? yes - less than once weekly; highest risk at bedtime 3am   -Treats with fruit juice    Lifestyle Factors:   Previous visit with dietician: no  Current diet: B: Reyes's hasbrown/ biscuit sandwich            L: pecan chicken salad with grapes                       D: steak; slaw                        Snacks: none                       Beverages: water; unsw tea; rare 0 soda; juice --rare (boluses for it); SF lemonade  Current exercise: work is very physical. 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  Also walks 30 mins per day around neighborhood    Health Maintenance:  Eye exam current (within one year): yes Date: [facility]  Any history of foot problems? no  Foot exam up to date: yes Date: 2022, Dr. Frankie Hollins up to date:    Pneumonia: no   COVID-19: yes  The ASCVD Risk score (Davida Montague, et al., 2013) failed to calculate for the following reasons: The 2013 ASCVD risk score is only valid for ages 36 to 78   Last panel - LDL:   Lab Results   Component Value Date    LDLCALC 203 (A) 2022     Taking ASA:  No   Taking statin: no  Last microalbumin/creatinine ratio:   Microalbumin Creatinine Ratio   Date Value Ref Range Status   2022 90.3  Final      Taking ACE/ARB: no  Depression screening completed. 2022    Focus:   Diabetes Education. Last A1C: 10.1% 22. Caitlin Mederos is currently using her old OmniPod pump as her meter and using  test strips to check her BG. BG testing is random. He is waiting for Funnelytronic pump and sensor supplies from [employer]. Will check on status of this order. He has been off the pump since 2021 when he ran out of supplies and has not been doing well at testing/ managing his BS's. [facility] reports today that they will be reaching out to Caitlin Mederos regarding the pump supplies. Rich can let the clinic know as soon as supplies are being sent. Curriculum Area Focus: Diabetes complications, Glucose checks/goals, Physical activity goals, Hypoglycemia management and Lifestyle & healthy coping  DSME PLAN:     Check blood sugars before each meal and before going to bed      Goal for         Put a note on the fridge handle to check blood sugar before eating          Put a not bathroom mirror to check blood sugar at bedtime  Get rid of all of your  test strips! Keep walking every day! Good job! The week before seeing the dietician--write down every you are eating                How much you ate and how many carbs you counted.   We will check on the status of your insulin pump supplies      Goals Addressed                 This Visit's Progress     3 meals per day; 45 grams carb per meal   Not on track     Exercise -w2alk 30-60 minutes 5-6 days per week   Not on track         Meter download, medications, PMH and nursing assessment reviewed. Delroy Latricia states He is willing to participate in this plan of care and verbalized understanding of all instructions provided. Teach back used to verify comprehension. Follow-up: 6 weeks    Total time involved in direct patient education: 60 minutes.

## 2022-07-14 DIAGNOSIS — E10.9 TYPE 1 DIABETES MELLITUS WITHOUT COMPLICATION (HCC): ICD-10-CM

## 2022-07-14 RX ORDER — INSULIN GLARGINE 100 [IU]/ML
35 INJECTION, SOLUTION SUBCUTANEOUS NIGHTLY
Qty: 5 PEN | Refills: 3 | Status: SHIPPED | OUTPATIENT
Start: 2022-07-14

## 2022-07-15 RX ORDER — INSULIN GLARGINE 100 [IU]/ML
35 INJECTION, SOLUTION SUBCUTANEOUS NIGHTLY
Qty: 5 PEN | Refills: 0 | OUTPATIENT
Start: 2022-07-15

## 2022-07-20 ENCOUNTER — TELEPHONE (OUTPATIENT)
Dept: INTERNAL MEDICINE CLINIC | Age: 22
End: 2022-07-20

## 2022-07-20 NOTE — TELEPHONE ENCOUNTER
Call to [facility]. Did he get his pump supplies. Is he using the insulin pump/ CGM or not? Left message to call the clinic back with update.

## 2022-07-21 ENCOUNTER — TELEPHONE (OUTPATIENT)
Dept: INTERNAL MEDICINE CLINIC | Age: 22
End: 2022-07-21

## 2022-07-21 NOTE — TELEPHONE ENCOUNTER
Called Rich to discuss status of pump supply order. Rich reports that he hasn't received any phone calls/updates from [facility]. Called [facility] to check on status. Found that [facility] needs new prescriptions & chart notes.  Faxed

## 2022-08-01 ENCOUNTER — TELEPHONE (OUTPATIENT)
Dept: INTERNAL MEDICINE CLINIC | Age: 22
End: 2022-08-01

## 2022-10-03 ENCOUNTER — TELEPHONE (OUTPATIENT)
Dept: INTERNAL MEDICINE CLINIC | Age: 22
End: 2022-10-03

## 2022-10-03 NOTE — TELEPHONE ENCOUNTER
Pt called in saying she has a medtronic pump now and would like to switch over from pens to the pump. I advised her she has not been seen in clinic or by Luis Alberto Walker in a while and we will need to see him in clinic.   We scheduled appt with Dr. Reuel Boeck on 10/11/22 at 1:00 pm.

## 2022-10-04 DIAGNOSIS — E10.9 TYPE 1 DIABETES MELLITUS WITHOUT COMPLICATION (HCC): ICD-10-CM

## 2022-10-04 RX ORDER — INSULIN ASPART 100 [IU]/ML
INJECTION, SOLUTION INTRAVENOUS; SUBCUTANEOUS
Status: CANCELLED | OUTPATIENT
Start: 2022-10-04

## 2022-10-05 DIAGNOSIS — E10.9 TYPE 1 DIABETES MELLITUS WITHOUT COMPLICATION (HCC): ICD-10-CM

## 2022-10-11 RX ORDER — INSULIN ASPART 100 [IU]/ML
INJECTION, SOLUTION INTRAVENOUS; SUBCUTANEOUS
Qty: 5 ADJUSTABLE DOSE PRE-FILLED PEN SYRINGE | Refills: 0 | Status: SHIPPED | OUTPATIENT
Start: 2022-10-11